# Patient Record
Sex: MALE | Race: BLACK OR AFRICAN AMERICAN | NOT HISPANIC OR LATINO | URBAN - METROPOLITAN AREA
[De-identification: names, ages, dates, MRNs, and addresses within clinical notes are randomized per-mention and may not be internally consistent; named-entity substitution may affect disease eponyms.]

---

## 2020-01-01 ENCOUNTER — INPATIENT (INPATIENT)
Facility: HOSPITAL | Age: 85
LOS: 9 days | End: 2020-07-20
Attending: INTERNAL MEDICINE | Admitting: INTERNAL MEDICINE
Payer: MEDICARE

## 2020-01-01 VITALS
DIASTOLIC BLOOD PRESSURE: 61 MMHG | OXYGEN SATURATION: 94 % | SYSTOLIC BLOOD PRESSURE: 85 MMHG | HEART RATE: 93 BPM | RESPIRATION RATE: 17 BRPM

## 2020-01-01 VITALS
WEIGHT: 130.07 LBS | HEART RATE: 116 BPM | DIASTOLIC BLOOD PRESSURE: 42 MMHG | SYSTOLIC BLOOD PRESSURE: 64 MMHG | OXYGEN SATURATION: 97 % | TEMPERATURE: 101 F | HEIGHT: 71 IN

## 2020-01-01 DIAGNOSIS — A41.52 SEPSIS DUE TO PSEUDOMONAS: ICD-10-CM

## 2020-01-01 DIAGNOSIS — E44.0 MODERATE PROTEIN-CALORIE MALNUTRITION: ICD-10-CM

## 2020-01-01 DIAGNOSIS — D50.0 IRON DEFICIENCY ANEMIA SECONDARY TO BLOOD LOSS (CHRONIC): ICD-10-CM

## 2020-01-01 DIAGNOSIS — K59.00 CONSTIPATION, UNSPECIFIED: ICD-10-CM

## 2020-01-01 DIAGNOSIS — C90.00 MULTIPLE MYELOMA NOT HAVING ACHIEVED REMISSION: ICD-10-CM

## 2020-01-01 LAB
-  AMIKACIN: SIGNIFICANT CHANGE UP
-  AZTREONAM: SIGNIFICANT CHANGE UP
-  CEFEPIME: SIGNIFICANT CHANGE UP
-  CEFTAZIDIME: SIGNIFICANT CHANGE UP
-  CIPROFLOXACIN: SIGNIFICANT CHANGE UP
-  GENTAMICIN: SIGNIFICANT CHANGE UP
-  IMIPENEM: SIGNIFICANT CHANGE UP
-  LEVOFLOXACIN: SIGNIFICANT CHANGE UP
-  MEROPENEM: SIGNIFICANT CHANGE UP
-  PIPERACILLIN/TAZOBACTAM: SIGNIFICANT CHANGE UP
-  TOBRAMYCIN: SIGNIFICANT CHANGE UP
ALBUMIN SERPL ELPH-MCNC: 1.4 G/DL — LOW (ref 3.3–5)
ALLERGY+IMMUNOLOGY DIAG STUDY NOTE: SIGNIFICANT CHANGE UP
ALP SERPL-CCNC: 51 U/L — SIGNIFICANT CHANGE UP (ref 40–120)
ALT FLD-CCNC: 8 U/L — LOW (ref 12–78)
ANION GAP SERPL CALC-SCNC: 10 MMOL/L — SIGNIFICANT CHANGE UP (ref 5–17)
ANION GAP SERPL CALC-SCNC: 7 MMOL/L — SIGNIFICANT CHANGE UP (ref 5–17)
ANION GAP SERPL CALC-SCNC: 9 MMOL/L — SIGNIFICANT CHANGE UP (ref 5–17)
ANISOCYTOSIS BLD QL: SIGNIFICANT CHANGE UP
APPEARANCE UR: ABNORMAL
APPEARANCE UR: CLEAR — SIGNIFICANT CHANGE UP
APTT BLD: 36 SEC — HIGH (ref 27.5–35.5)
AST SERPL-CCNC: 15 U/L — SIGNIFICANT CHANGE UP (ref 15–37)
BACTERIA # UR AUTO: ABNORMAL
BASE EXCESS BLDA CALC-SCNC: 0.7 MMOL/L — SIGNIFICANT CHANGE UP (ref -2–2)
BASOPHILS # BLD AUTO: 0 K/UL — SIGNIFICANT CHANGE UP (ref 0–0.2)
BASOPHILS NFR BLD AUTO: 0 % — SIGNIFICANT CHANGE UP (ref 0–2)
BILIRUB SERPL-MCNC: 0.5 MG/DL — SIGNIFICANT CHANGE UP (ref 0.2–1.2)
BILIRUB UR-MCNC: NEGATIVE — SIGNIFICANT CHANGE UP
BILIRUB UR-MCNC: NEGATIVE — SIGNIFICANT CHANGE UP
BLD GP AB SCN SERPL QL: SIGNIFICANT CHANGE UP
BLD GP AB SCN SERPL QL: SIGNIFICANT CHANGE UP
BLOOD GAS COMMENTS: SIGNIFICANT CHANGE UP
BLOOD GAS COMMENTS: SIGNIFICANT CHANGE UP
BLOOD GAS SOURCE: SIGNIFICANT CHANGE UP
BUN SERPL-MCNC: 16 MG/DL — SIGNIFICANT CHANGE UP (ref 7–23)
BUN SERPL-MCNC: 17 MG/DL — SIGNIFICANT CHANGE UP (ref 7–23)
BUN SERPL-MCNC: 19 MG/DL — SIGNIFICANT CHANGE UP (ref 7–23)
BUN SERPL-MCNC: 20 MG/DL — SIGNIFICANT CHANGE UP (ref 7–23)
CA-I BLD-SCNC: 1.13 MMOL/L — SIGNIFICANT CHANGE UP (ref 1.12–1.3)
CALCIUM SERPL-MCNC: 7.1 MG/DL — LOW (ref 8.5–10.1)
CALCIUM SERPL-MCNC: 7.2 MG/DL — LOW (ref 8.5–10.1)
CALCIUM SERPL-MCNC: 7.8 MG/DL — LOW (ref 8.5–10.1)
CALCIUM SERPL-MCNC: 7.8 MG/DL — LOW (ref 8.5–10.1)
CALCIUM SERPL-MCNC: 8.1 MG/DL — LOW (ref 8.5–10.1)
CALCIUM SERPL-MCNC: 8.2 MG/DL — LOW (ref 8.5–10.1)
CHLORIDE SERPL-SCNC: 104 MMOL/L — SIGNIFICANT CHANGE UP (ref 96–108)
CHLORIDE SERPL-SCNC: 109 MMOL/L — HIGH (ref 96–108)
CHLORIDE SERPL-SCNC: 110 MMOL/L — HIGH (ref 96–108)
CHLORIDE SERPL-SCNC: 111 MMOL/L — HIGH (ref 96–108)
CHLORIDE SERPL-SCNC: 112 MMOL/L — HIGH (ref 96–108)
CHLORIDE SERPL-SCNC: 113 MMOL/L — HIGH (ref 96–108)
CO2 SERPL-SCNC: 20 MMOL/L — LOW (ref 22–31)
CO2 SERPL-SCNC: 21 MMOL/L — LOW (ref 22–31)
CO2 SERPL-SCNC: 22 MMOL/L — SIGNIFICANT CHANGE UP (ref 22–31)
CO2 SERPL-SCNC: 22 MMOL/L — SIGNIFICANT CHANGE UP (ref 22–31)
CO2 SERPL-SCNC: 23 MMOL/L — SIGNIFICANT CHANGE UP (ref 22–31)
CO2 SERPL-SCNC: 24 MMOL/L — SIGNIFICANT CHANGE UP (ref 22–31)
COLOR SPEC: YELLOW — SIGNIFICANT CHANGE UP
COLOR SPEC: YELLOW — SIGNIFICANT CHANGE UP
CREAT SERPL-MCNC: 0.84 MG/DL — SIGNIFICANT CHANGE UP (ref 0.5–1.3)
CREAT SERPL-MCNC: 0.84 MG/DL — SIGNIFICANT CHANGE UP (ref 0.5–1.3)
CREAT SERPL-MCNC: 0.93 MG/DL — SIGNIFICANT CHANGE UP (ref 0.5–1.3)
CREAT SERPL-MCNC: 0.99 MG/DL — SIGNIFICANT CHANGE UP (ref 0.5–1.3)
CREAT SERPL-MCNC: 1.02 MG/DL — SIGNIFICANT CHANGE UP (ref 0.5–1.3)
CREAT SERPL-MCNC: 1.05 MG/DL — SIGNIFICANT CHANGE UP (ref 0.5–1.3)
CULTURE RESULTS: SIGNIFICANT CHANGE UP
DIFF PNL FLD: ABNORMAL
DIFF PNL FLD: ABNORMAL
DIR ANTIGLOB POLYSPECIFIC INTERPRETATION: SIGNIFICANT CHANGE UP
DIR ANTIGLOB POLYSPECIFIC INTERPRETATION: SIGNIFICANT CHANGE UP
ELLIPTOCYTES BLD QL SMEAR: SLIGHT — SIGNIFICANT CHANGE UP
EOSINOPHIL # BLD AUTO: 0 K/UL — SIGNIFICANT CHANGE UP (ref 0–0.5)
EOSINOPHIL NFR BLD AUTO: 0 % — SIGNIFICANT CHANGE UP (ref 0–6)
EPI CELLS # UR: ABNORMAL
EPI CELLS # UR: SIGNIFICANT CHANGE UP
GLUCOSE BLDC GLUCOMTR-MCNC: 141 MG/DL — HIGH (ref 70–99)
GLUCOSE SERPL-MCNC: 124 MG/DL — HIGH (ref 70–99)
GLUCOSE SERPL-MCNC: 134 MG/DL — HIGH (ref 70–99)
GLUCOSE SERPL-MCNC: 140 MG/DL — HIGH (ref 70–99)
GLUCOSE SERPL-MCNC: 141 MG/DL — HIGH (ref 70–99)
GLUCOSE SERPL-MCNC: 186 MG/DL — HIGH (ref 70–99)
GLUCOSE SERPL-MCNC: 90 MG/DL — SIGNIFICANT CHANGE UP (ref 70–99)
GLUCOSE UR QL: NEGATIVE MG/DL — SIGNIFICANT CHANGE UP
GLUCOSE UR QL: NEGATIVE MG/DL — SIGNIFICANT CHANGE UP
HCO3 BLDA-SCNC: 24 MMOL/L — SIGNIFICANT CHANGE UP (ref 21–29)
HCT VFR BLD CALC: 21.7 % — LOW (ref 39–50)
HCT VFR BLD CALC: 21.9 % — LOW (ref 39–50)
HCT VFR BLD CALC: 22.3 % — LOW (ref 39–50)
HCT VFR BLD CALC: 23.7 % — LOW (ref 39–50)
HCT VFR BLD CALC: 24.3 % — LOW (ref 39–50)
HCT VFR BLD CALC: 24.9 % — LOW (ref 39–50)
HCT VFR BLD CALC: 26 % — LOW (ref 39–50)
HCT VFR BLD CALC: 26.7 % — LOW (ref 39–50)
HCT VFR BLD CALC: 26.8 % — LOW (ref 39–50)
HCT VFR BLD CALC: 27.6 % — LOW (ref 39–50)
HCT VFR BLD CALC: 28 % — LOW (ref 39–50)
HCT VFR BLD CALC: 29.2 % — LOW (ref 39–50)
HCT VFR BLD CALC: 29.5 % — LOW (ref 39–50)
HGB BLD-MCNC: 6.8 G/DL — CRITICAL LOW (ref 13–17)
HGB BLD-MCNC: 7 G/DL — CRITICAL LOW (ref 13–17)
HGB BLD-MCNC: 7 G/DL — CRITICAL LOW (ref 13–17)
HGB BLD-MCNC: 7.2 G/DL — LOW (ref 13–17)
HGB BLD-MCNC: 7.4 G/DL — LOW (ref 13–17)
HGB BLD-MCNC: 7.7 G/DL — LOW (ref 13–17)
HGB BLD-MCNC: 8.3 G/DL — LOW (ref 13–17)
HGB BLD-MCNC: 8.6 G/DL — LOW (ref 13–17)
HGB BLD-MCNC: 8.7 G/DL — LOW (ref 13–17)
HGB BLD-MCNC: 8.8 G/DL — LOW (ref 13–17)
HGB BLD-MCNC: 9.2 G/DL — LOW (ref 13–17)
HOROWITZ INDEX BLDA+IHG-RTO: 0.21 — SIGNIFICANT CHANGE UP
HYALINE CASTS # UR AUTO: ABNORMAL /LPF
HYPOCHROMIA BLD QL: SIGNIFICANT CHANGE UP
INR BLD: 1.87 RATIO — HIGH (ref 0.88–1.16)
KETONES UR-MCNC: NEGATIVE — SIGNIFICANT CHANGE UP
KETONES UR-MCNC: NEGATIVE — SIGNIFICANT CHANGE UP
LACTATE SERPL-SCNC: 0.9 MMOL/L — SIGNIFICANT CHANGE UP (ref 0.7–2)
LACTATE SERPL-SCNC: 1.1 MMOL/L — SIGNIFICANT CHANGE UP (ref 0.7–2)
LACTATE SERPL-SCNC: 2.1 MMOL/L — HIGH (ref 0.7–2)
LACTATE SERPL-SCNC: 2.3 MMOL/L — HIGH (ref 0.7–2)
LEGIONELLA AG UR QL: NEGATIVE — SIGNIFICANT CHANGE UP
LEUKOCYTE ESTERASE UR-ACNC: ABNORMAL
LEUKOCYTE ESTERASE UR-ACNC: ABNORMAL
LYMPHOCYTES # BLD AUTO: 2.29 K/UL — SIGNIFICANT CHANGE UP (ref 1–3.3)
LYMPHOCYTES # BLD AUTO: 28 % — SIGNIFICANT CHANGE UP (ref 13–44)
MACROCYTES BLD QL: SLIGHT — SIGNIFICANT CHANGE UP
MAGNESIUM SERPL-MCNC: 2.3 MG/DL — SIGNIFICANT CHANGE UP (ref 1.6–2.6)
MAGNESIUM SERPL-MCNC: 2.3 MG/DL — SIGNIFICANT CHANGE UP (ref 1.6–2.6)
MAGNESIUM SERPL-MCNC: 2.4 MG/DL — SIGNIFICANT CHANGE UP (ref 1.6–2.6)
MANUAL SMEAR VERIFICATION: SIGNIFICANT CHANGE UP
MCHC RBC-ENTMCNC: 30.4 GM/DL — LOW (ref 32–36)
MCHC RBC-ENTMCNC: 30.5 GM/DL — LOW (ref 32–36)
MCHC RBC-ENTMCNC: 30.6 PG — SIGNIFICANT CHANGE UP (ref 27–34)
MCHC RBC-ENTMCNC: 30.7 PG — SIGNIFICANT CHANGE UP (ref 27–34)
MCHC RBC-ENTMCNC: 30.9 GM/DL — LOW (ref 32–36)
MCHC RBC-ENTMCNC: 30.9 PG — SIGNIFICANT CHANGE UP (ref 27–34)
MCHC RBC-ENTMCNC: 30.9 PG — SIGNIFICANT CHANGE UP (ref 27–34)
MCHC RBC-ENTMCNC: 31.1 PG — SIGNIFICANT CHANGE UP (ref 27–34)
MCHC RBC-ENTMCNC: 31.2 GM/DL — LOW (ref 32–36)
MCHC RBC-ENTMCNC: 31.3 GM/DL — LOW (ref 32–36)
MCHC RBC-ENTMCNC: 31.3 PG — SIGNIFICANT CHANGE UP (ref 27–34)
MCHC RBC-ENTMCNC: 31.3 PG — SIGNIFICANT CHANGE UP (ref 27–34)
MCHC RBC-ENTMCNC: 31.4 GM/DL — LOW (ref 32–36)
MCHC RBC-ENTMCNC: 31.4 PG — SIGNIFICANT CHANGE UP (ref 27–34)
MCHC RBC-ENTMCNC: 31.5 GM/DL — LOW (ref 32–36)
MCHC RBC-ENTMCNC: 31.5 PG — SIGNIFICANT CHANGE UP (ref 27–34)
MCHC RBC-ENTMCNC: 31.9 GM/DL — LOW (ref 32–36)
MCHC RBC-ENTMCNC: 31.9 GM/DL — LOW (ref 32–36)
MCHC RBC-ENTMCNC: 32 GM/DL — SIGNIFICANT CHANGE UP (ref 32–36)
MCHC RBC-ENTMCNC: 32.1 GM/DL — SIGNIFICANT CHANGE UP (ref 32–36)
MCHC RBC-ENTMCNC: 32.6 GM/DL — SIGNIFICANT CHANGE UP (ref 32–36)
MCHC RBC-ENTMCNC: 32.9 GM/DL — SIGNIFICANT CHANGE UP (ref 32–36)
MCV RBC AUTO: 100 FL — SIGNIFICANT CHANGE UP (ref 80–100)
MCV RBC AUTO: 100.9 FL — HIGH (ref 80–100)
MCV RBC AUTO: 103.4 FL — HIGH (ref 80–100)
MCV RBC AUTO: 94.7 FL — SIGNIFICANT CHANGE UP (ref 80–100)
MCV RBC AUTO: 95.6 FL — SIGNIFICANT CHANGE UP (ref 80–100)
MCV RBC AUTO: 95.7 FL — SIGNIFICANT CHANGE UP (ref 80–100)
MCV RBC AUTO: 97 FL — SIGNIFICANT CHANGE UP (ref 80–100)
MCV RBC AUTO: 97.3 FL — SIGNIFICANT CHANGE UP (ref 80–100)
MCV RBC AUTO: 98.5 FL — SIGNIFICANT CHANGE UP (ref 80–100)
MCV RBC AUTO: 98.6 FL — SIGNIFICANT CHANGE UP (ref 80–100)
MCV RBC AUTO: 98.8 FL — SIGNIFICANT CHANGE UP (ref 80–100)
MCV RBC AUTO: 99 FL — SIGNIFICANT CHANGE UP (ref 80–100)
MCV RBC AUTO: 99.6 FL — SIGNIFICANT CHANGE UP (ref 80–100)
METHOD TYPE: SIGNIFICANT CHANGE UP
MICROCYTES BLD QL: SLIGHT — SIGNIFICANT CHANGE UP
MONOCYTES # BLD AUTO: 0.08 K/UL — SIGNIFICANT CHANGE UP (ref 0–0.9)
MONOCYTES NFR BLD AUTO: 1 % — LOW (ref 2–14)
NEUTROPHILS # BLD AUTO: 5.81 K/UL — SIGNIFICANT CHANGE UP (ref 1.8–7.4)
NEUTROPHILS NFR BLD AUTO: 70 % — SIGNIFICANT CHANGE UP (ref 43–77)
NEUTS BAND # BLD: 1 % — SIGNIFICANT CHANGE UP (ref 0–8)
NITRITE UR-MCNC: NEGATIVE — SIGNIFICANT CHANGE UP
NITRITE UR-MCNC: NEGATIVE — SIGNIFICANT CHANGE UP
NRBC # BLD: 0 /100 WBCS — SIGNIFICANT CHANGE UP (ref 0–0)
NRBC # BLD: 0 /100 — SIGNIFICANT CHANGE UP (ref 0–0)
NRBC # BLD: 1 /100 WBCS — HIGH (ref 0–0)
NRBC # BLD: 2 /100 WBCS — HIGH (ref 0–0)
NRBC # BLD: 3 /100 WBCS — HIGH (ref 0–0)
NRBC # BLD: 6 /100 WBCS — HIGH (ref 0–0)
NRBC # BLD: 6 /100 WBCS — HIGH (ref 0–0)
NRBC # BLD: 7 /100 WBCS — HIGH (ref 0–0)
NRBC # BLD: 9 /100 WBCS — HIGH (ref 0–0)
NRBC # BLD: SIGNIFICANT CHANGE UP /100 WBCS (ref 0–0)
OB PNL STL: NEGATIVE — SIGNIFICANT CHANGE UP
ORGANISM # SPEC MICROSCOPIC CNT: SIGNIFICANT CHANGE UP
ORGANISM # SPEC MICROSCOPIC CNT: SIGNIFICANT CHANGE UP
PCO2 BLDA: 31 MMHG — LOW (ref 32–46)
PH BLD: 7.49 — HIGH (ref 7.35–7.45)
PH UR: 5 — SIGNIFICANT CHANGE UP (ref 5–8)
PH UR: 7 — SIGNIFICANT CHANGE UP (ref 5–8)
PHOSPHATE SERPL-MCNC: 2.5 MG/DL — SIGNIFICANT CHANGE UP (ref 2.5–4.5)
PHOSPHATE SERPL-MCNC: 3 MG/DL — SIGNIFICANT CHANGE UP (ref 2.5–4.5)
PLAT MORPH BLD: NORMAL — SIGNIFICANT CHANGE UP
PLATELET # BLD AUTO: 27 K/UL — LOW (ref 150–400)
PLATELET # BLD AUTO: 34 K/UL — LOW (ref 150–400)
PLATELET # BLD AUTO: 40 K/UL — LOW (ref 150–400)
PLATELET # BLD AUTO: 43 K/UL — LOW (ref 150–400)
PLATELET # BLD AUTO: 51 K/UL — LOW (ref 150–400)
PLATELET # BLD AUTO: 52 K/UL — LOW (ref 150–400)
PLATELET # BLD AUTO: 52 K/UL — LOW (ref 150–400)
PLATELET # BLD AUTO: 53 K/UL — LOW (ref 150–400)
PLATELET # BLD AUTO: 53 K/UL — LOW (ref 150–400)
PLATELET # BLD AUTO: 54 K/UL — LOW (ref 150–400)
PLATELET # BLD AUTO: 57 K/UL — LOW (ref 150–400)
PLATELET # BLD AUTO: 66 K/UL — LOW (ref 150–400)
PLATELET # BLD AUTO: 75 K/UL — LOW (ref 150–400)
PO2 BLDA: 82 MMHG — SIGNIFICANT CHANGE UP (ref 74–108)
POIKILOCYTOSIS BLD QL AUTO: SLIGHT — SIGNIFICANT CHANGE UP
POTASSIUM SERPL-MCNC: 3.6 MMOL/L — SIGNIFICANT CHANGE UP (ref 3.5–5.3)
POTASSIUM SERPL-MCNC: 3.6 MMOL/L — SIGNIFICANT CHANGE UP (ref 3.5–5.3)
POTASSIUM SERPL-MCNC: 3.8 MMOL/L — SIGNIFICANT CHANGE UP (ref 3.5–5.3)
POTASSIUM SERPL-MCNC: 4.1 MMOL/L — SIGNIFICANT CHANGE UP (ref 3.5–5.3)
POTASSIUM SERPL-MCNC: 4.3 MMOL/L — SIGNIFICANT CHANGE UP (ref 3.5–5.3)
POTASSIUM SERPL-MCNC: 4.5 MMOL/L — SIGNIFICANT CHANGE UP (ref 3.5–5.3)
POTASSIUM SERPL-SCNC: 3.6 MMOL/L — SIGNIFICANT CHANGE UP (ref 3.5–5.3)
POTASSIUM SERPL-SCNC: 3.6 MMOL/L — SIGNIFICANT CHANGE UP (ref 3.5–5.3)
POTASSIUM SERPL-SCNC: 3.8 MMOL/L — SIGNIFICANT CHANGE UP (ref 3.5–5.3)
POTASSIUM SERPL-SCNC: 4.1 MMOL/L — SIGNIFICANT CHANGE UP (ref 3.5–5.3)
POTASSIUM SERPL-SCNC: 4.3 MMOL/L — SIGNIFICANT CHANGE UP (ref 3.5–5.3)
POTASSIUM SERPL-SCNC: 4.5 MMOL/L — SIGNIFICANT CHANGE UP (ref 3.5–5.3)
PROT SERPL-MCNC: 7.8 GM/DL — SIGNIFICANT CHANGE UP (ref 6–8.3)
PROT UR-MCNC: 30 MG/DL
PROT UR-MCNC: 500 MG/DL
PROTHROM AB SERPL-ACNC: 20.1 SEC — HIGH (ref 10.6–13.6)
RBC # BLD: 2.17 M/UL — LOW (ref 4.2–5.8)
RBC # BLD: 2.24 M/UL — LOW (ref 4.2–5.8)
RBC # BLD: 2.25 M/UL — LOW (ref 4.2–5.8)
RBC # BLD: 2.35 M/UL — LOW (ref 4.2–5.8)
RBC # BLD: 2.35 M/UL — LOW (ref 4.2–5.8)
RBC # BLD: 2.52 M/UL — LOW (ref 4.2–5.8)
RBC # BLD: 2.64 M/UL — LOW (ref 4.2–5.8)
RBC # BLD: 2.8 M/UL — LOW (ref 4.2–5.8)
RBC # BLD: 2.8 M/UL — LOW (ref 4.2–5.8)
RBC # BLD: 2.82 M/UL — LOW (ref 4.2–5.8)
RBC # BLD: 2.93 M/UL — LOW (ref 4.2–5.8)
RBC # BLD: 2.98 M/UL — LOW (ref 4.2–5.8)
RBC # BLD: 3.01 M/UL — LOW (ref 4.2–5.8)
RBC # FLD: 21.1 % — HIGH (ref 10.3–14.5)
RBC # FLD: 21.3 % — HIGH (ref 10.3–14.5)
RBC # FLD: 21.7 % — HIGH (ref 10.3–14.5)
RBC # FLD: 22.2 % — HIGH (ref 10.3–14.5)
RBC # FLD: 23.9 % — HIGH (ref 10.3–14.5)
RBC # FLD: 24 % — HIGH (ref 10.3–14.5)
RBC # FLD: 24.1 % — HIGH (ref 10.3–14.5)
RBC # FLD: 24.2 % — HIGH (ref 10.3–14.5)
RBC # FLD: 24.4 % — HIGH (ref 10.3–14.5)
RBC # FLD: 24.5 % — HIGH (ref 10.3–14.5)
RBC # FLD: 24.5 % — HIGH (ref 10.3–14.5)
RBC # FLD: 24.7 % — HIGH (ref 10.3–14.5)
RBC # FLD: 25.8 % — HIGH (ref 10.3–14.5)
RBC BLD AUTO: ABNORMAL
RBC CASTS # UR COMP ASSIST: ABNORMAL /HPF (ref 0–4)
RBC CASTS # UR COMP ASSIST: SIGNIFICANT CHANGE UP /HPF (ref 0–4)
SAO2 % BLDA: 96 % — SIGNIFICANT CHANGE UP (ref 92–96)
SARS-COV-2 IGG SERPL QL IA: NEGATIVE — SIGNIFICANT CHANGE UP
SARS-COV-2 IGM SERPL IA-ACNC: <0.1 INDEX — SIGNIFICANT CHANGE UP
SARS-COV-2 RNA SPEC QL NAA+PROBE: SIGNIFICANT CHANGE UP
SMUDGE CELLS # BLD: PRESENT — SIGNIFICANT CHANGE UP
SODIUM SERPL-SCNC: 138 MMOL/L — SIGNIFICANT CHANGE UP (ref 135–145)
SODIUM SERPL-SCNC: 139 MMOL/L — SIGNIFICANT CHANGE UP (ref 135–145)
SODIUM SERPL-SCNC: 141 MMOL/L — SIGNIFICANT CHANGE UP (ref 135–145)
SODIUM SERPL-SCNC: 144 MMOL/L — SIGNIFICANT CHANGE UP (ref 135–145)
SP GR SPEC: 1 — LOW (ref 1.01–1.02)
SP GR SPEC: 1.01 — SIGNIFICANT CHANGE UP (ref 1.01–1.02)
SPECIMEN SOURCE: SIGNIFICANT CHANGE UP
TROPONIN I SERPL-MCNC: 0.04 NG/ML — SIGNIFICANT CHANGE UP (ref 0.01–0.04)
UROBILINOGEN FLD QL: NEGATIVE MG/DL — SIGNIFICANT CHANGE UP
UROBILINOGEN FLD QL: NEGATIVE MG/DL — SIGNIFICANT CHANGE UP
WBC # BLD: 2.8 K/UL — LOW (ref 3.8–10.5)
WBC # BLD: 2.9 K/UL — LOW (ref 3.8–10.5)
WBC # BLD: 3.04 K/UL — LOW (ref 3.8–10.5)
WBC # BLD: 3.2 K/UL — LOW (ref 3.8–10.5)
WBC # BLD: 3.25 K/UL — LOW (ref 3.8–10.5)
WBC # BLD: 3.45 K/UL — LOW (ref 3.8–10.5)
WBC # BLD: 3.55 K/UL — LOW (ref 3.8–10.5)
WBC # BLD: 4.21 K/UL — SIGNIFICANT CHANGE UP (ref 3.8–10.5)
WBC # BLD: 7.86 K/UL — SIGNIFICANT CHANGE UP (ref 3.8–10.5)
WBC # BLD: 8.18 K/UL — SIGNIFICANT CHANGE UP (ref 3.8–10.5)
WBC # BLD: 8.2 K/UL — SIGNIFICANT CHANGE UP (ref 3.8–10.5)
WBC # BLD: 8.26 K/UL — SIGNIFICANT CHANGE UP (ref 3.8–10.5)
WBC # BLD: 8.69 K/UL — SIGNIFICANT CHANGE UP (ref 3.8–10.5)
WBC # FLD AUTO: 2.8 K/UL — LOW (ref 3.8–10.5)
WBC # FLD AUTO: 2.9 K/UL — LOW (ref 3.8–10.5)
WBC # FLD AUTO: 3.04 K/UL — LOW (ref 3.8–10.5)
WBC # FLD AUTO: 3.2 K/UL — LOW (ref 3.8–10.5)
WBC # FLD AUTO: 3.25 K/UL — LOW (ref 3.8–10.5)
WBC # FLD AUTO: 3.45 K/UL — LOW (ref 3.8–10.5)
WBC # FLD AUTO: 3.55 K/UL — LOW (ref 3.8–10.5)
WBC # FLD AUTO: 4.21 K/UL — SIGNIFICANT CHANGE UP (ref 3.8–10.5)
WBC # FLD AUTO: 7.86 K/UL — SIGNIFICANT CHANGE UP (ref 3.8–10.5)
WBC # FLD AUTO: 8.18 K/UL — SIGNIFICANT CHANGE UP (ref 3.8–10.5)
WBC # FLD AUTO: 8.2 K/UL — SIGNIFICANT CHANGE UP (ref 3.8–10.5)
WBC # FLD AUTO: 8.26 K/UL — SIGNIFICANT CHANGE UP (ref 3.8–10.5)
WBC # FLD AUTO: 8.69 K/UL — SIGNIFICANT CHANGE UP (ref 3.8–10.5)
WBC UR QL: >50
WBC UR QL: SIGNIFICANT CHANGE UP

## 2020-01-01 PROCEDURE — 99233 SBSQ HOSP IP/OBS HIGH 50: CPT

## 2020-01-01 PROCEDURE — 86077 PHYS BLOOD BANK SERV XMATCH: CPT

## 2020-01-01 PROCEDURE — 99498 ADVNCD CARE PLAN ADDL 30 MIN: CPT

## 2020-01-01 PROCEDURE — 99291 CRITICAL CARE FIRST HOUR: CPT

## 2020-01-01 PROCEDURE — 99223 1ST HOSP IP/OBS HIGH 75: CPT

## 2020-01-01 PROCEDURE — 99239 HOSP IP/OBS DSCHRG MGMT >30: CPT

## 2020-01-01 PROCEDURE — 71045 X-RAY EXAM CHEST 1 VIEW: CPT | Mod: 26

## 2020-01-01 PROCEDURE — 93010 ELECTROCARDIOGRAM REPORT: CPT

## 2020-01-01 PROCEDURE — 99497 ADVNCD CARE PLAN 30 MIN: CPT

## 2020-01-01 PROCEDURE — 70450 CT HEAD/BRAIN W/O DYE: CPT | Mod: 26

## 2020-01-01 RX ORDER — SODIUM CHLORIDE 9 MG/ML
1000 INJECTION, SOLUTION INTRAVENOUS
Refills: 0 | Status: DISCONTINUED | OUTPATIENT
Start: 2020-01-01 | End: 2020-01-01

## 2020-01-01 RX ORDER — PROCHLORPERAZINE MALEATE 5 MG
0 TABLET ORAL
Qty: 0 | Refills: 0 | DISCHARGE

## 2020-01-01 RX ORDER — CIPROFLOXACIN LACTATE 400MG/40ML
VIAL (ML) INTRAVENOUS
Refills: 0 | Status: DISCONTINUED | OUTPATIENT
Start: 2020-01-01 | End: 2020-01-01

## 2020-01-01 RX ORDER — PIPERACILLIN AND TAZOBACTAM 4; .5 G/20ML; G/20ML
3.38 INJECTION, POWDER, LYOPHILIZED, FOR SOLUTION INTRAVENOUS ONCE
Refills: 0 | Status: DISCONTINUED | OUTPATIENT
Start: 2020-01-01 | End: 2020-01-01

## 2020-01-01 RX ORDER — ACETAMINOPHEN 500 MG
650 TABLET ORAL EVERY 6 HOURS
Refills: 0 | Status: DISCONTINUED | OUTPATIENT
Start: 2020-01-01 | End: 2020-01-01

## 2020-01-01 RX ORDER — DEXAMETHASONE 0.5 MG/5ML
4 ELIXIR ORAL
Qty: 0 | Refills: 0 | DISCHARGE

## 2020-01-01 RX ORDER — PANTOPRAZOLE SODIUM 20 MG/1
40 TABLET, DELAYED RELEASE ORAL DAILY
Refills: 0 | Status: DISCONTINUED | OUTPATIENT
Start: 2020-01-01 | End: 2020-01-01

## 2020-01-01 RX ORDER — CIPROFLOXACIN LACTATE 400MG/40ML
400 VIAL (ML) INTRAVENOUS ONCE
Refills: 0 | Status: COMPLETED | OUTPATIENT
Start: 2020-01-01 | End: 2020-01-01

## 2020-01-01 RX ORDER — LATANOPROST 0.05 MG/ML
1 SOLUTION/ DROPS OPHTHALMIC; TOPICAL AT BEDTIME
Refills: 0 | Status: DISCONTINUED | OUTPATIENT
Start: 2020-01-01 | End: 2020-01-01

## 2020-01-01 RX ORDER — CIPROFLOXACIN LACTATE 400MG/40ML
400 VIAL (ML) INTRAVENOUS EVERY 12 HOURS
Refills: 0 | Status: DISCONTINUED | OUTPATIENT
Start: 2020-01-01 | End: 2020-01-01

## 2020-01-01 RX ORDER — MIDODRINE HYDROCHLORIDE 2.5 MG/1
10 TABLET ORAL ONCE
Refills: 0 | Status: COMPLETED | OUTPATIENT
Start: 2020-01-01 | End: 2020-01-01

## 2020-01-01 RX ORDER — CHLORHEXIDINE GLUCONATE 213 G/1000ML
1 SOLUTION TOPICAL
Refills: 0 | Status: DISCONTINUED | OUTPATIENT
Start: 2020-01-01 | End: 2020-01-01

## 2020-01-01 RX ORDER — ACYCLOVIR SODIUM 500 MG
0 VIAL (EA) INTRAVENOUS
Qty: 0 | Refills: 0 | DISCHARGE

## 2020-01-01 RX ORDER — SODIUM CHLORIDE 9 MG/ML
2000 INJECTION, SOLUTION INTRAVENOUS ONCE
Refills: 0 | Status: COMPLETED | OUTPATIENT
Start: 2020-01-01 | End: 2020-01-01

## 2020-01-01 RX ORDER — MIDODRINE HYDROCHLORIDE 2.5 MG/1
10 TABLET ORAL THREE TIMES A DAY
Refills: 0 | Status: DISCONTINUED | OUTPATIENT
Start: 2020-01-01 | End: 2020-01-01

## 2020-01-01 RX ORDER — LACTULOSE 10 G/15ML
20 SOLUTION ORAL ONCE
Refills: 0 | Status: COMPLETED | OUTPATIENT
Start: 2020-01-01 | End: 2020-01-01

## 2020-01-01 RX ORDER — SODIUM CHLORIDE 9 MG/ML
500 INJECTION INTRAMUSCULAR; INTRAVENOUS; SUBCUTANEOUS ONCE
Refills: 0 | Status: COMPLETED | OUTPATIENT
Start: 2020-01-01 | End: 2020-01-01

## 2020-01-01 RX ORDER — DEXAMETHASONE 0.5 MG/5ML
0 ELIXIR ORAL
Qty: 0 | Refills: 0 | DISCHARGE

## 2020-01-01 RX ORDER — DEXAMETHASONE 0.5 MG/5ML
20 ELIXIR ORAL EVERY 12 HOURS
Refills: 0 | Status: DISCONTINUED | OUTPATIENT
Start: 2020-01-01 | End: 2020-01-01

## 2020-01-01 RX ORDER — SODIUM CHLORIDE 9 MG/ML
1000 INJECTION INTRAMUSCULAR; INTRAVENOUS; SUBCUTANEOUS
Refills: 0 | Status: DISCONTINUED | OUTPATIENT
Start: 2020-01-01 | End: 2020-01-01

## 2020-01-01 RX ORDER — ENOXAPARIN SODIUM 100 MG/ML
40 INJECTION SUBCUTANEOUS DAILY
Refills: 0 | Status: DISCONTINUED | OUTPATIENT
Start: 2020-01-01 | End: 2020-01-01

## 2020-01-01 RX ORDER — HYDROCORTISONE 20 MG
50 TABLET ORAL EVERY 8 HOURS
Refills: 0 | Status: DISCONTINUED | OUTPATIENT
Start: 2020-01-01 | End: 2020-01-01

## 2020-01-01 RX ORDER — PANTOPRAZOLE SODIUM 20 MG/1
40 TABLET, DELAYED RELEASE ORAL
Refills: 0 | Status: DISCONTINUED | OUTPATIENT
Start: 2020-01-01 | End: 2020-01-01

## 2020-01-01 RX ORDER — BRIMONIDINE TARTRATE 2 MG/MG
1 SOLUTION/ DROPS OPHTHALMIC
Refills: 0 | Status: DISCONTINUED | OUTPATIENT
Start: 2020-01-01 | End: 2020-01-01

## 2020-01-01 RX ORDER — PIPERACILLIN AND TAZOBACTAM 4; .5 G/20ML; G/20ML
3.38 INJECTION, POWDER, LYOPHILIZED, FOR SOLUTION INTRAVENOUS EVERY 8 HOURS
Refills: 0 | Status: DISCONTINUED | OUTPATIENT
Start: 2020-01-01 | End: 2020-01-01

## 2020-01-01 RX ORDER — MIDODRINE HYDROCHLORIDE 2.5 MG/1
20 TABLET ORAL EVERY 8 HOURS
Refills: 0 | Status: DISCONTINUED | OUTPATIENT
Start: 2020-01-01 | End: 2020-01-01

## 2020-01-01 RX ORDER — SELINEXOR 20 MG/1
0 TABLET, FILM COATED ORAL
Qty: 0 | Refills: 0 | DISCHARGE

## 2020-01-01 RX ORDER — BRINZOLAMIDE/BRIMONIDINE TARTRATE 10; 2 MG/ML; MG/ML
1 SUSPENSION/ DROPS OPHTHALMIC
Qty: 0 | Refills: 0 | DISCHARGE

## 2020-01-01 RX ORDER — PIPERACILLIN AND TAZOBACTAM 4; .5 G/20ML; G/20ML
3.38 INJECTION, POWDER, LYOPHILIZED, FOR SOLUTION INTRAVENOUS ONCE
Refills: 0 | Status: COMPLETED | OUTPATIENT
Start: 2020-01-01 | End: 2020-01-01

## 2020-01-01 RX ORDER — NOREPINEPHRINE BITARTRATE/D5W 8 MG/250ML
0.05 PLASTIC BAG, INJECTION (ML) INTRAVENOUS
Qty: 8 | Refills: 0 | Status: DISCONTINUED | OUTPATIENT
Start: 2020-01-01 | End: 2020-01-01

## 2020-01-01 RX ORDER — SODIUM CHLORIDE 9 MG/ML
1000 INJECTION INTRAMUSCULAR; INTRAVENOUS; SUBCUTANEOUS ONCE
Refills: 0 | Status: COMPLETED | OUTPATIENT
Start: 2020-01-01 | End: 2020-01-01

## 2020-01-01 RX ORDER — ACYCLOVIR SODIUM 500 MG
400 VIAL (EA) INTRAVENOUS
Refills: 0 | Status: DISCONTINUED | OUTPATIENT
Start: 2020-01-01 | End: 2020-01-01

## 2020-01-01 RX ORDER — LEVETIRACETAM 250 MG/1
750 TABLET, FILM COATED ORAL EVERY 12 HOURS
Refills: 0 | Status: DISCONTINUED | OUTPATIENT
Start: 2020-01-01 | End: 2020-01-01

## 2020-01-01 RX ORDER — CEFTRIAXONE 500 MG/1
1000 INJECTION, POWDER, FOR SOLUTION INTRAMUSCULAR; INTRAVENOUS EVERY 24 HOURS
Refills: 0 | Status: DISCONTINUED | OUTPATIENT
Start: 2020-01-01 | End: 2020-01-01

## 2020-01-01 RX ORDER — ESOMEPRAZOLE MAGNESIUM 40 MG/1
0 CAPSULE, DELAYED RELEASE ORAL
Qty: 0 | Refills: 0 | DISCHARGE

## 2020-01-01 RX ORDER — TENOFOVIR DISOPROXIL FUMARATE 300 MG/1
0 TABLET, FILM COATED ORAL
Qty: 0 | Refills: 0 | DISCHARGE

## 2020-01-01 RX ORDER — HEPARIN SODIUM 5000 [USP'U]/ML
5000 INJECTION INTRAVENOUS; SUBCUTANEOUS EVERY 12 HOURS
Refills: 0 | Status: DISCONTINUED | OUTPATIENT
Start: 2020-01-01 | End: 2020-01-01

## 2020-01-01 RX ORDER — ACYCLOVIR SODIUM 500 MG
450 VIAL (EA) INTRAVENOUS EVERY 12 HOURS
Refills: 0 | Status: DISCONTINUED | OUTPATIENT
Start: 2020-01-01 | End: 2020-01-01

## 2020-01-01 RX ORDER — LATANOPROST 0.05 MG/ML
1 SOLUTION/ DROPS OPHTHALMIC; TOPICAL
Qty: 0 | Refills: 0 | DISCHARGE

## 2020-01-01 RX ORDER — ACETAMINOPHEN 500 MG
975 TABLET ORAL ONCE
Refills: 0 | Status: COMPLETED | OUTPATIENT
Start: 2020-01-01 | End: 2020-01-01

## 2020-01-01 RX ORDER — DORZOLAMIDE HYDROCHLORIDE 20 MG/ML
1 SOLUTION/ DROPS OPHTHALMIC
Refills: 0 | Status: DISCONTINUED | OUTPATIENT
Start: 2020-01-01 | End: 2020-01-01

## 2020-01-01 RX ORDER — VANCOMYCIN HCL 1 G
1000 VIAL (EA) INTRAVENOUS ONCE
Refills: 0 | Status: COMPLETED | OUTPATIENT
Start: 2020-01-01 | End: 2020-01-01

## 2020-01-01 RX ORDER — PROCHLORPERAZINE MALEATE 5 MG
10 TABLET ORAL
Qty: 0 | Refills: 0 | DISCHARGE

## 2020-01-01 RX ORDER — HYDROCORTISONE 20 MG
100 TABLET ORAL EVERY 8 HOURS
Refills: 0 | Status: DISCONTINUED | OUTPATIENT
Start: 2020-01-01 | End: 2020-01-01

## 2020-01-01 RX ORDER — SENNA PLUS 8.6 MG/1
2 TABLET ORAL AT BEDTIME
Refills: 0 | Status: DISCONTINUED | OUTPATIENT
Start: 2020-01-01 | End: 2020-01-01

## 2020-01-01 RX ORDER — SODIUM CHLORIDE 9 MG/ML
500 INJECTION INTRAMUSCULAR; INTRAVENOUS; SUBCUTANEOUS ONCE
Refills: 0 | Status: DISCONTINUED | OUTPATIENT
Start: 2020-01-01 | End: 2020-01-01

## 2020-01-01 RX ORDER — DEXAMETHASONE 0.5 MG/5ML
40 ELIXIR ORAL ONCE
Refills: 0 | Status: COMPLETED | OUTPATIENT
Start: 2020-01-01 | End: 2020-01-01

## 2020-01-01 RX ORDER — ACYCLOVIR SODIUM 500 MG
450 VIAL (EA) INTRAVENOUS
Refills: 0 | Status: DISCONTINUED | OUTPATIENT
Start: 2020-01-01 | End: 2020-01-01

## 2020-01-01 RX ORDER — LEVETIRACETAM 250 MG/1
750 TABLET, FILM COATED ORAL
Refills: 0 | Status: DISCONTINUED | OUTPATIENT
Start: 2020-01-01 | End: 2020-01-01

## 2020-01-01 RX ORDER — ERYTHROPOIETIN 10000 [IU]/ML
10000 INJECTION, SOLUTION INTRAVENOUS; SUBCUTANEOUS ONCE
Refills: 0 | Status: COMPLETED | OUTPATIENT
Start: 2020-01-01 | End: 2020-01-01

## 2020-01-01 RX ORDER — VANCOMYCIN HCL 1 G
1000 VIAL (EA) INTRAVENOUS EVERY 12 HOURS
Refills: 0 | Status: DISCONTINUED | OUTPATIENT
Start: 2020-01-01 | End: 2020-01-01

## 2020-01-01 RX ORDER — ACYCLOVIR SODIUM 500 MG
400 VIAL (EA) INTRAVENOUS
Qty: 0 | Refills: 0 | DISCHARGE

## 2020-01-01 RX ORDER — ONDANSETRON 8 MG/1
1 TABLET, FILM COATED ORAL
Qty: 0 | Refills: 0 | DISCHARGE

## 2020-01-01 RX ORDER — LOPERAMIDE HCL 2 MG
1 TABLET ORAL
Qty: 0 | Refills: 0 | DISCHARGE

## 2020-01-01 RX ADMIN — Medication 50 MILLIGRAM(S): at 14:20

## 2020-01-01 RX ADMIN — Medication 109 MILLIGRAM(S): at 17:43

## 2020-01-01 RX ADMIN — MIDODRINE HYDROCHLORIDE 10 MILLIGRAM(S): 2.5 TABLET ORAL at 14:20

## 2020-01-01 RX ADMIN — DORZOLAMIDE HYDROCHLORIDE 1 DROP(S): 20 SOLUTION/ DROPS OPHTHALMIC at 18:03

## 2020-01-01 RX ADMIN — Medication 109 MILLIGRAM(S): at 05:09

## 2020-01-01 RX ADMIN — Medication 109 MILLIGRAM(S): at 17:15

## 2020-01-01 RX ADMIN — Medication 120 MILLIGRAM(S): at 16:45

## 2020-01-01 RX ADMIN — MIDODRINE HYDROCHLORIDE 20 MILLIGRAM(S): 2.5 TABLET ORAL at 22:05

## 2020-01-01 RX ADMIN — Medication 200 MILLIGRAM(S): at 18:03

## 2020-01-01 RX ADMIN — CEFTRIAXONE 100 MILLIGRAM(S): 500 INJECTION, POWDER, FOR SOLUTION INTRAMUSCULAR; INTRAVENOUS at 12:54

## 2020-01-01 RX ADMIN — LEVETIRACETAM 400 MILLIGRAM(S): 250 TABLET, FILM COATED ORAL at 06:27

## 2020-01-01 RX ADMIN — Medication 109 MILLIGRAM(S): at 08:29

## 2020-01-01 RX ADMIN — ERYTHROPOIETIN 10000 UNIT(S): 10000 INJECTION, SOLUTION INTRAVENOUS; SUBCUTANEOUS at 14:41

## 2020-01-01 RX ADMIN — Medication 200 MILLIGRAM(S): at 17:19

## 2020-01-01 RX ADMIN — Medication 5.53 MICROGRAM(S)/KG/MIN: at 18:30

## 2020-01-01 RX ADMIN — Medication 200 MILLIGRAM(S): at 05:03

## 2020-01-01 RX ADMIN — MIDODRINE HYDROCHLORIDE 10 MILLIGRAM(S): 2.5 TABLET ORAL at 12:05

## 2020-01-01 RX ADMIN — SODIUM CHLORIDE 2000 MILLILITER(S): 9 INJECTION, SOLUTION INTRAVENOUS at 07:40

## 2020-01-01 RX ADMIN — LACTULOSE 20 GRAM(S): 10 SOLUTION ORAL at 14:58

## 2020-01-01 RX ADMIN — BRIMONIDINE TARTRATE 1 DROP(S): 2 SOLUTION/ DROPS OPHTHALMIC at 18:02

## 2020-01-01 RX ADMIN — DORZOLAMIDE HYDROCHLORIDE 1 DROP(S): 20 SOLUTION/ DROPS OPHTHALMIC at 17:03

## 2020-01-01 RX ADMIN — LEVETIRACETAM 400 MILLIGRAM(S): 250 TABLET, FILM COATED ORAL at 17:15

## 2020-01-01 RX ADMIN — SODIUM CHLORIDE 150 MILLILITER(S): 9 INJECTION INTRAMUSCULAR; INTRAVENOUS; SUBCUTANEOUS at 12:53

## 2020-01-01 RX ADMIN — LEVETIRACETAM 400 MILLIGRAM(S): 250 TABLET, FILM COATED ORAL at 17:45

## 2020-01-01 RX ADMIN — Medication 50 MILLIGRAM(S): at 05:02

## 2020-01-01 RX ADMIN — Medication 200 MILLIGRAM(S): at 22:21

## 2020-01-01 RX ADMIN — LEVETIRACETAM 400 MILLIGRAM(S): 250 TABLET, FILM COATED ORAL at 05:00

## 2020-01-01 RX ADMIN — Medication 200 MILLIGRAM(S): at 06:12

## 2020-01-01 RX ADMIN — SODIUM CHLORIDE 75 MILLILITER(S): 9 INJECTION, SOLUTION INTRAVENOUS at 16:45

## 2020-01-01 RX ADMIN — Medication 200 MILLIGRAM(S): at 05:10

## 2020-01-01 RX ADMIN — Medication 200 MILLIGRAM(S): at 17:16

## 2020-01-01 RX ADMIN — SODIUM CHLORIDE 150 MILLILITER(S): 9 INJECTION INTRAMUSCULAR; INTRAVENOUS; SUBCUTANEOUS at 13:51

## 2020-01-01 RX ADMIN — LEVETIRACETAM 400 MILLIGRAM(S): 250 TABLET, FILM COATED ORAL at 05:02

## 2020-01-01 RX ADMIN — Medication 400 MILLIGRAM(S): at 05:06

## 2020-01-01 RX ADMIN — SENNA PLUS 2 TABLET(S): 8.6 TABLET ORAL at 22:46

## 2020-01-01 RX ADMIN — Medication 200 MILLIGRAM(S): at 05:44

## 2020-01-01 RX ADMIN — Medication 100 MILLIGRAM(S): at 12:05

## 2020-01-01 RX ADMIN — ENOXAPARIN SODIUM 40 MILLIGRAM(S): 100 INJECTION SUBCUTANEOUS at 13:49

## 2020-01-01 RX ADMIN — SENNA PLUS 2 TABLET(S): 8.6 TABLET ORAL at 22:40

## 2020-01-01 RX ADMIN — Medication 200 MILLIGRAM(S): at 18:18

## 2020-01-01 RX ADMIN — Medication 400 MILLIGRAM(S): at 17:02

## 2020-01-01 RX ADMIN — BRIMONIDINE TARTRATE 1 DROP(S): 2 SOLUTION/ DROPS OPHTHALMIC at 17:03

## 2020-01-01 RX ADMIN — SODIUM CHLORIDE 1000 MILLILITER(S): 9 INJECTION INTRAMUSCULAR; INTRAVENOUS; SUBCUTANEOUS at 11:03

## 2020-01-01 RX ADMIN — PIPERACILLIN AND TAZOBACTAM 200 GRAM(S): 4; .5 INJECTION, POWDER, LYOPHILIZED, FOR SOLUTION INTRAVENOUS at 15:33

## 2020-01-01 RX ADMIN — Medication 109 MILLIGRAM(S): at 05:02

## 2020-01-01 RX ADMIN — PIPERACILLIN AND TAZOBACTAM 200 GRAM(S): 4; .5 INJECTION, POWDER, LYOPHILIZED, FOR SOLUTION INTRAVENOUS at 07:45

## 2020-01-01 RX ADMIN — SODIUM CHLORIDE 150 MILLILITER(S): 9 INJECTION INTRAMUSCULAR; INTRAVENOUS; SUBCUTANEOUS at 22:32

## 2020-01-01 RX ADMIN — Medication 50 MILLIGRAM(S): at 22:31

## 2020-01-01 RX ADMIN — PANTOPRAZOLE SODIUM 40 MILLIGRAM(S): 20 TABLET, DELAYED RELEASE ORAL at 12:54

## 2020-01-01 RX ADMIN — Medication 200 MILLIGRAM(S): at 05:37

## 2020-01-01 RX ADMIN — Medication 200 MILLIGRAM(S): at 05:47

## 2020-01-01 RX ADMIN — Medication 200 MILLIGRAM(S): at 18:49

## 2020-01-01 RX ADMIN — BRIMONIDINE TARTRATE 1 DROP(S): 2 SOLUTION/ DROPS OPHTHALMIC at 05:03

## 2020-01-01 RX ADMIN — LATANOPROST 1 DROP(S): 0.05 SOLUTION/ DROPS OPHTHALMIC; TOPICAL at 21:42

## 2020-01-01 RX ADMIN — SODIUM CHLORIDE 75 MILLILITER(S): 9 INJECTION, SOLUTION INTRAVENOUS at 22:16

## 2020-01-01 RX ADMIN — LEVETIRACETAM 750 MILLIGRAM(S): 250 TABLET, FILM COATED ORAL at 20:08

## 2020-01-01 RX ADMIN — SODIUM CHLORIDE 75 MILLILITER(S): 9 INJECTION, SOLUTION INTRAVENOUS at 11:16

## 2020-01-01 RX ADMIN — Medication 975 MILLIGRAM(S): at 07:55

## 2020-01-01 RX ADMIN — Medication 50 MILLIGRAM(S): at 21:41

## 2020-01-01 RX ADMIN — Medication 200 MILLIGRAM(S): at 05:32

## 2020-01-01 RX ADMIN — CHLORHEXIDINE GLUCONATE 1 APPLICATION(S): 213 SOLUTION TOPICAL at 13:00

## 2020-01-01 RX ADMIN — MIDODRINE HYDROCHLORIDE 10 MILLIGRAM(S): 2.5 TABLET ORAL at 17:02

## 2020-01-01 RX ADMIN — SENNA PLUS 2 TABLET(S): 8.6 TABLET ORAL at 21:50

## 2020-01-01 RX ADMIN — MIDODRINE HYDROCHLORIDE 10 MILLIGRAM(S): 2.5 TABLET ORAL at 05:06

## 2020-01-01 RX ADMIN — PANTOPRAZOLE SODIUM 40 MILLIGRAM(S): 20 TABLET, DELAYED RELEASE ORAL at 12:05

## 2020-01-01 RX ADMIN — LEVETIRACETAM 750 MILLIGRAM(S): 250 TABLET, FILM COATED ORAL at 18:01

## 2020-01-01 RX ADMIN — Medication 200 MILLIGRAM(S): at 20:04

## 2020-01-01 RX ADMIN — LATANOPROST 1 DROP(S): 0.05 SOLUTION/ DROPS OPHTHALMIC; TOPICAL at 22:40

## 2020-01-01 RX ADMIN — DORZOLAMIDE HYDROCHLORIDE 1 DROP(S): 20 SOLUTION/ DROPS OPHTHALMIC at 05:03

## 2020-01-01 RX ADMIN — SODIUM CHLORIDE 75 MILLILITER(S): 9 INJECTION, SOLUTION INTRAVENOUS at 05:04

## 2020-01-01 RX ADMIN — CEFTRIAXONE 100 MILLIGRAM(S): 500 INJECTION, POWDER, FOR SOLUTION INTRAMUSCULAR; INTRAVENOUS at 13:49

## 2020-01-01 RX ADMIN — MIDODRINE HYDROCHLORIDE 10 MILLIGRAM(S): 2.5 TABLET ORAL at 12:54

## 2020-01-01 RX ADMIN — SENNA PLUS 2 TABLET(S): 8.6 TABLET ORAL at 22:23

## 2020-01-01 RX ADMIN — CHLORHEXIDINE GLUCONATE 1 APPLICATION(S): 213 SOLUTION TOPICAL at 06:36

## 2020-01-01 RX ADMIN — ENOXAPARIN SODIUM 40 MILLIGRAM(S): 100 INJECTION SUBCUTANEOUS at 12:53

## 2020-01-01 RX ADMIN — SODIUM CHLORIDE 1000 MILLILITER(S): 9 INJECTION INTRAMUSCULAR; INTRAVENOUS; SUBCUTANEOUS at 08:19

## 2020-01-01 RX ADMIN — Medication 110 MILLIGRAM(S): at 05:02

## 2020-01-01 RX ADMIN — Medication 400 MILLIGRAM(S): at 18:42

## 2020-01-01 RX ADMIN — Medication 250 MILLIGRAM(S): at 08:18

## 2020-01-01 RX ADMIN — LEVETIRACETAM 750 MILLIGRAM(S): 250 TABLET, FILM COATED ORAL at 05:03

## 2020-07-10 NOTE — H&P ADULT - NSICDXFAMILYHX_GEN_ALL_CORE_FT
FAMILY HISTORY:  Sibling  Still living? Yes, Estimated age: 71-80  Family history of diabetes mellitus, Age at diagnosis: Age Unknown

## 2020-07-10 NOTE — DIETITIAN INITIAL EVALUATION ADULT. - ETIOLOGY
Inadequate energy/ protein intake; increased energy/protein needs related to Multiple Myeloma; Prostate Cancer

## 2020-07-10 NOTE — ED ADULT NURSE NOTE - OBJECTIVE STATEMENT
Patient received as a code sepsis with AMS from home. Patient place immediately on cardiac monitor. Heplock inserted to to L/ AC, LR infusing bolus as per MD order. Blood collected and sent to lab. stage 2 to sacrum. dressing applied to site.

## 2020-07-10 NOTE — CONSULT NOTE ADULT - PROBLEM SELECTOR RECOMMENDATION 9
- called patients wife, in chart, person on other line says wrong number, and not related to patient  - D/w patients Oncologist Dr Marlow, was on [place], was on selinxor recently, [place] can interfere with Cross matching  - spoke with blood bank they spoke to [facility] and are trying to order appropriate blood for patient as soon as possible  - ICU evaluation

## 2020-07-10 NOTE — H&P ADULT - ASSESSMENT
84 year old male PMH HTN, mm and prostate ca p/w sepsis, likely urosepsis. Patient developed cough and fever last night and this morning woke up confused and altered mental status. EMS called this morning, they were unable to get iv and found him hypotensive. Given IVF in ER.   Lactate 2.1 on arrival. UA appeared infected.     ID: 84 year old male PMH HTN, mm and prostate ca p/w sepsis, likely urosepsis. Patient developed cough and fever last night and this morning woke up confused and altered mental status. EMS called this morning, they were unable to get iv and found him hypotensive. Given IVF in ER.   Lactate 2.1 on arrival. UA appeared infected.     ID: UA appears infected. Follow-up blood and urine cultures. COVID-19 nasal swab pending. Started IV Ceftriaxone for presumed   UTI. CXR is clear. Lactate 2.1. Sepsis present on arrival. Continue Acyclovir 400 bid for Zoster prevention.      Heme: History of prostate cancer and Multiple myloma. HGB low on arrival 7.0. Blood bank needs type and screen prior to transfusion.   PRBC written.  Added stress dose steroids given sepsis and use of decadron weekly at home.      Called wife [phone] to review PMH, no one home, will call again. 84 year old male PMH HTN, mm and prostate ca p/w sepsis, likely urosepsis. Patient developed cough and fever last night and this morning woke up confused and altered mental status. EMS called this morning, they were unable to get iv and found him hypotensive. Given IVF in ER.   Lactate 2.1 on arrival. UA appeared infected.     ID: UA appears infected. Follow-up blood and urine cultures. COVID-19 nasal swab pending. Started IV Ceftriaxone for presumed   UTI. CXR is clear. Lactate 2.1. Sepsis present on arrival. Continue Acyclovir 400 bid for Zoster prevention.      Heme: History of prostate cancer and Multiple myloma. HGB low on arrival 7.0. Blood bank needs type and screen prior to transfusion.   PRBC written.  Added stress dose steroids given sepsis and use of decadron weekly at home.      Called wife [phone], chemo regime is weekly. Oncologist does not come here, will ask oncology to follow here.

## 2020-07-10 NOTE — ED PROVIDER NOTE - OBJECTIVE STATEMENT
84M hx mm and prostate ca pw sepsis. pt developed cough and fever last night and this morning woke up confused and altered. ems called this morning, they were unable to get iv and found him hypotensive

## 2020-07-10 NOTE — CONSULT NOTE ADULT - SUBJECTIVE AND OBJECTIVE BOX
84 year old male PMH HTN, Multiple Myeloma, and prostate ca p/w sepsis, likely urosepsis. Patient developed cough and fever last night and this morning woke up confused and altered mental status. EMS called this morning, they were unable to get iv and found him hypotensive. Given IVF (3L LR) in ER but BP was not responsive at that time. Patients UA on admission positive. ICU consulted for persistent hypotension despite fluid resuscitation. On exam, patient noted to be lying in bed in NAD. Noted to be hypotensive with SBP in 80s.  Patient admits to increased urinary frequency. Patient denied any complaints including fevers, chills, headaches, chest pain, shortness of breath, abdominal pain, N/V/D, dysuria    MEDICATIONS  (STANDING):  acyclovir   Oral Tab/Cap 400 milliGRAM(s) Oral two times a day  brimonidine 0.2% Ophthalmic Solution 1 Drop(s) Left EYE two times a day  cefTRIAXone   IVPB 1000 milliGRAM(s) IV Intermittent every 24 hours  chlorhexidine 4% Liquid 1 Application(s) Topical <User Schedule>  dorzolamide 2% Ophthalmic Solution 1 Drop(s) Left EYE <User Schedule>  enoxaparin Injectable 40 milliGRAM(s) SubCutaneous daily  hydrocortisone sodium succinate Injectable 100 milliGRAM(s) IV Push every 8 hours  latanoprost 0.005% Ophthalmic Solution 1 Drop(s) Both EYES at bedtime  midodrine. 10 milliGRAM(s) Oral three times a day  pantoprazole  Injectable 40 milliGRAM(s) IV Push daily  sodium chloride 0.9% Bolus 500 milliLiter(s) IV Bolus once  sodium chloride 0.9%. 1000 milliLiter(s) (150 mL/Hr) IV Continuous <Continuous>      Daily Height in cm: 180.34 (10 Jul 2020 07:16)    Daily   Drug Dosing Weight  Height (cm): 180.34 (10 Jul 2020 07:16)  Weight (kg): 59 (10 Jul 2020 07:16)  BMI (kg/m2): 18.1 (10 Jul 2020 07:16)  BSA (m2): 1.76 (10 Jul 2020 07:16)    PAST MEDICAL & SURGICAL HISTORY:  Hypertension  Urinary incontinence  Radiation cystitis  Prostate cancer  S/P TURP      FAMILY HISTORY:  Family history of diabetes mellitus (Sibling)      SOCIAL HISTORY: Denies smoking, ETOH, illicit drug use     ADVANCE DIRECTIVES: FULL CODE     REVIEW OF SYSTEMS:  CONSTITUTIONAL: No fever, weight loss, or fatigue  EYES: No eye pain, visual disturbances, or discharge  ENMT:  No difficulty hearing, tinnitus, vertigo; No sinus or throat pain  NECK: No pain or stiffness  BREASTS: No pain, masses, or nipple discharge  RESPIRATORY: No cough, wheezing, chills or hemoptysis; No shortness of breath  CARDIOVASCULAR: No chest pain, palpitations, dizziness, or leg swelling  GASTROINTESTINAL: No abdominal or epigastric pain. No nausea, vomiting, or hematemesis; No diarrhea or constipation. No melena or hematochezia.  GENITOURINARY: No dysuria, frequency, hematuria, or incontinence  NEUROLOGICAL: No headaches, memory loss, loss of strength, numbness, or tremors  SKIN: No itching, burning, rashes, or lesions   LYMPH NODES: No enlarged glands  ENDOCRINE: No heat or cold intolerance; No hair loss  MUSCULOSKELETAL: No joint pain or swelling; No muscle, back, or extremity pain  PSYCHIATRIC: No depression, anxiety, mood swings, or difficulty sleeping    ICU Vital Signs Last 24 Hrs  T(C): 35.7 (10 Jul 2020 12:46), Max: 38.3 (10 Jul 2020 07:16)  T(F): 96.3 (10 Jul 2020 12:46), Max: 101 (10 Jul 2020 07:16)  HR: 86 (10 Jul 2020 12:46) (84 - 116)  BP: 100/64 (10 Jul 2020 12:46) (64/42 - 113/67)  RR: 13 (10 Jul 2020 12:46) (12 - 16)  SpO2: 99% (10 Jul 2020 12:46) (97% - 99%)      ABG - ( 10 Jul 2020 08:20 )  pH, Arterial: x     pH, Blood: 7.49  /  pCO2: 31    /  pO2: 82    / HCO3: 24    / Base Excess: 0.7   /  SaO2: 96          I&O's Detail    10 Jul 2020 07:01  -  10 Jul 2020 13:43  --------------------------------------------------------  IN:    Sodium Chloride 0.9% IV Bolus: 500 mL  Total IN: 500 mL    OUT:  Total OUT: 0 mL    Total NET: 500 mL          PHYSICAL EXAM:    GENERAL: NAD, well-groomed, cachectic   HEAD:  Atraumatic, Normocephalic  EYES: EOMI, PERRLA, conjunctiva and sclera clear  ENMT: No tonsillar erythema, exudates, or enlargement; Moist mucous membranes, Good dentition, No lesions  NECK: Supple, No JVD, Normal thyroid  NERVOUS SYSTEM:  Alert & Oriented X3, Good concentration; Motor Strength 5/5 B/L upper and lower extremities; DTRs 2+ intact and symmetric  CHEST/LUNG: Clear to percussion bilaterally; No rales, rhonchi, wheezing, or rubs  HEART: Regular rate and rhythm; No murmurs, rubs, or gallops  ABDOMEN: Soft, Nontender, Nondistended; Bowel sounds present  EXTREMITIES:  2+ Peripheral Pulses, No clubbing, cyanosis, or edema  LYMPH: No lymphadenopathy noted  SKIN: No rashes or lesions    LABS:  CBC Full  -  ( 10 Jul 2020 07:43 )  WBC Count : 8.18 K/uL  RBC Count : 2.25 M/uL  Hemoglobin : 7.0 g/dL  Hematocrit : 21.9 %  Platelet Count - Automated : 75 K/uL  Mean Cell Volume : 97.3 fl  Mean Cell Hemoglobin : 31.1 pg  Mean Cell Hemoglobin Concentration : 32.0 gm/dL  Auto Neutrophil # : 5.81 K/uL  Auto Lymphocyte # : 2.29 K/uL  Auto Monocyte # : 0.08 K/uL  Auto Eosinophil # : 0.00 K/uL  Auto Basophil # : 0.00 K/uL  Auto Neutrophil % : 70.0 %  Auto Lymphocyte % : 28.0 %  Auto Monocyte % : 1.0 %  Auto Eosinophil % : 0.0 %  Auto Basophil % : 0.0 %    07-10    138  |  104  |  16  ----------------------------<  90  3.6   |  24  |  1.02    Ca    8.2<L>      10 Jul 2020 07:43  Mg     2.3     07-10    TPro  7.8  /  Alb  1.4<L>  /  TBili  0.5  /  DBili  x   /  AST  15  /  ALT  8<L>  /  AlkPhos  51  07-10    CAPILLARY BLOOD GLUCOSE        PT/INR - ( 10 Jul 2020 07:43 )   PT: 20.1 sec;   INR: 1.87 ratio         PTT - ( 10 Jul 2020 07:43 )  PTT:36.0 sec  Urinalysis Basic - ( 10 Jul 2020 08:08 )    Color: Yellow / Appearance: very cloudy / S.005 / pH: x  Gluc: x / Ketone: Negative  / Bili: Negative / Urobili: Negative mg/dL   Blood: x / Protein: 500 mg/dL / Nitrite: Negative   Leuk Esterase: Moderate / RBC: 11-25 /HPF / WBC >50   Sq Epi: x / Non Sq Epi: Moderate / Bacteria: x      CARDIAC MARKERS ( 10 Jul 2020 07:43 )  .037 ng/mL / x     / x     / x     / x 84 year old male PMH HTN, Multiple Myeloma, and prostate ca p/w sepsis, likely urosepsis. Patient developed cough and fever last night and this morning woke up confused and altered mental status. EMS called this morning, they were unable to get iv and found him hypotensive. Given IVF (3L LR) in ER but BP was not responsive at that time. Patients UA on admission positive. ICU consulted for persistent hypotension despite fluid resuscitation. On exam, patient noted to be lying in bed in NAD. Noted to be hypotensive with SBP in 80s.  Patient admits to increased urinary frequency. Patient denied any complaints including fevers, chills, headaches, chest pain, shortness of breath, abdominal pain, N/V/D, dysuria    MEDICATIONS  (STANDING):  acyclovir   Oral Tab/Cap 400 milliGRAM(s) Oral two times a day  brimonidine 0.2% Ophthalmic Solution 1 Drop(s) Left EYE two times a day  cefTRIAXone   IVPB 1000 milliGRAM(s) IV Intermittent every 24 hours  chlorhexidine 4% Liquid 1 Application(s) Topical <User Schedule>  dorzolamide 2% Ophthalmic Solution 1 Drop(s) Left EYE <User Schedule>  enoxaparin Injectable 40 milliGRAM(s) SubCutaneous daily  hydrocortisone sodium succinate Injectable 100 milliGRAM(s) IV Push every 8 hours  latanoprost 0.005% Ophthalmic Solution 1 Drop(s) Both EYES at bedtime  midodrine. 10 milliGRAM(s) Oral three times a day  pantoprazole  Injectable 40 milliGRAM(s) IV Push daily  sodium chloride 0.9% Bolus 500 milliLiter(s) IV Bolus once  sodium chloride 0.9%. 1000 milliLiter(s) (150 mL/Hr) IV Continuous <Continuous>      Daily Height in cm: 180.34 (10 Jul 2020 07:16)    Daily   Drug Dosing Weight  Height (cm): 180.34 (10 Jul 2020 07:16)  Weight (kg): 59 (10 Jul 2020 07:16)  BMI (kg/m2): 18.1 (10 Jul 2020 07:16)  BSA (m2): 1.76 (10 Jul 2020 07:16)    PAST MEDICAL & SURGICAL HISTORY:  Hypertension  Urinary incontinence  Radiation cystitis  Prostate cancer  S/P TURP      FAMILY HISTORY:  Family history of diabetes mellitus (Sibling)      SOCIAL HISTORY: Denies smoking, ETOH, illicit drug use     ADVANCE DIRECTIVES: FULL CODE     REVIEW OF SYSTEMS:  CONSTITUTIONAL: No fever, weight loss, or fatigue  EYES: No eye pain, visual disturbances, or discharge  ENMT:  No difficulty hearing, tinnitus, vertigo; No sinus or throat pain  NECK: No pain or stiffness  BREASTS: No pain, masses, or nipple discharge  RESPIRATORY: No cough, wheezing, chills or hemoptysis; No shortness of breath  CARDIOVASCULAR: No chest pain, palpitations, dizziness, or leg swelling  GASTROINTESTINAL: No abdominal or epigastric pain. No nausea, vomiting, or hematemesis; No diarrhea or constipation. No melena or hematochezia.  GENITOURINARY: No dysuria, frequency, hematuria, or incontinence  NEUROLOGICAL: No headaches, memory loss, loss of strength, numbness, or tremors  SKIN: No itching, burning, rashes, or lesions   LYMPH NODES: No enlarged glands  ENDOCRINE: No heat or cold intolerance; No hair loss  MUSCULOSKELETAL: No joint pain or swelling; No muscle, back, or extremity pain  PSYCHIATRIC: No depression, anxiety, mood swings, or difficulty sleeping    ICU Vital Signs Last 24 Hrs  T(C): 35.7 (10 Jul 2020 12:46), Max: 38.3 (10 Jul 2020 07:16)  T(F): 96.3 (10 Jul 2020 12:46), Max: 101 (10 Jul 2020 07:16)  HR: 86 (10 Jul 2020 12:46) (84 - 116)  BP: 100/64 (10 Jul 2020 12:46) (64/42 - 113/67)  RR: 13 (10 Jul 2020 12:46) (12 - 16)  SpO2: 99% (10 Jul 2020 12:46) (97% - 99%)      ABG - ( 10 Jul 2020 08:20 )  pH, Arterial: x     pH, Blood: 7.49  /  pCO2: 31    /  pO2: 82    / HCO3: 24    / Base Excess: 0.7   /  SaO2: 96          I&O's Detail    10 Jul 2020 07:01  -  10 Jul 2020 13:43  --------------------------------------------------------  IN:    Sodium Chloride 0.9% IV Bolus: 500 mL  Total IN: 500 mL    OUT:  Total OUT: 0 mL    Total NET: 500 mL          PHYSICAL EXAM:    GENERAL: NAD, well-groomed, cachectic   HEAD:  Atraumatic, Normocephalic  EYES: EOMI, PERRLA, conjunctiva and sclera clear  ENMT: No tonsillar erythema, exudates, or enlargement; Moist mucous membranes, Good dentition, No lesions  NECK: Supple, No JVD, Normal thyroid  NERVOUS SYSTEM:  Alert & Oriented X3, Good concentration; Motor Strength 5/5 B/L upper and lower extremities; DTRs 2+ intact and symmetric  CHEST/LUNG: Clear to percussion bilaterally; No rales, rhonchi, wheezing, or rubs  HEART: Regular rate and rhythm; No murmurs, rubs, or gallops  ABDOMEN: Soft, Nontender, Nondistended; Bowel sounds present  EXTREMITIES:  2+ Peripheral Pulses, No clubbing, cyanosis, or edema  LYMPH: No lymphadenopathy noted  SKIN: No rashes or lesions    LABS:  CBC Full  -  ( 10 Jul 2020 07:43 )  WBC Count : 8.18 K/uL  RBC Count : 2.25 M/uL  Hemoglobin : 7.0 g/dL  Hematocrit : 21.9 %  Platelet Count - Automated : 75 K/uL  Mean Cell Volume : 97.3 fl  Mean Cell Hemoglobin : 31.1 pg  Mean Cell Hemoglobin Concentration : 32.0 gm/dL  Auto Neutrophil # : 5.81 K/uL  Auto Lymphocyte # : 2.29 K/uL  Auto Monocyte # : 0.08 K/uL  Auto Eosinophil # : 0.00 K/uL  Auto Basophil # : 0.00 K/uL  Auto Neutrophil % : 70.0 %  Auto Lymphocyte % : 28.0 %  Auto Monocyte % : 1.0 %  Auto Eosinophil % : 0.0 %  Auto Basophil % : 0.0 %    07-10    138  |  104  |  16  ----------------------------<  90  3.6   |  24  |  1.02    Ca    8.2<L>      10 Jul 2020 07:43  Mg     2.3     07-10    TPro  7.8  /  Alb  1.4<L>  /  TBili  0.5  /  DBili  x   /  AST  15  /  ALT  8<L>  /  AlkPhos  51  07-10      PT/INR - ( 10 Jul 2020 07:43 )   PT: 20.1 sec;   INR: 1.87 ratio    PTT - ( 10 Jul 2020 07:43 )  PTT:36.0 sec      Urinalysis Basic - ( 10 Jul 2020 08:08 )    Color: Yellow / Appearance: very cloudy / S.005 / pH: x  Gluc: x / Ketone: Negative  / Bili: Negative / Urobili: Negative mg/dL   Blood: x / Protein: 500 mg/dL / Nitrite: Negative   Leuk Esterase: Moderate / RBC: 11-25 /HPF / WBC >50   Sq Epi: x / Non Sq Epi: Moderate / Bacteria: x      CARDIAC MARKERS ( 10 Jul 2020 07:43 )  0.037 ng/mL: trop

## 2020-07-10 NOTE — H&P ADULT - NSICDXPASTMEDICALHX_GEN_ALL_CORE_FT
PAST MEDICAL HISTORY:  Hypertension     Prostate cancer     Radiation cystitis     Urinary incontinence

## 2020-07-10 NOTE — ED ADULT NURSE REASSESSMENT NOTE - NS ED NURSE REASSESS COMMENT FT1
Nurse called lab for confirmation, as per [facility]  blood not ready to dispense. Lab will notify nurse when ready MD Horowitz and BONNIE Covington made aware. details…

## 2020-07-10 NOTE — DIETITIAN INITIAL EVALUATION ADULT. - PERTINENT LABORATORY DATA
07-10 Na138 mmol/L Glu 90 mg/dL K+ 3.6 mmol/L Cr  1.02 mg/dL BUN 16 mg/dL 07-10 Alb 1.4 g/dL<L>07-10 ALT 8 U/L<L> AST 15 U/L Alkaline Phosphatase 51 U/L

## 2020-07-10 NOTE — H&P ADULT - NSHPPHYSICALEXAM_GEN_ALL_CORE
PHYSICAL EXAMINATION:  Vital Signs Last 24 Hrs  T(C): 38.3 (10 Jul 2020 07:16), Max: 38.3 (10 Jul 2020 07:16)  T(F): 101 (10 Jul 2020 07:16), Max: 101 (10 Jul 2020 07:16)  HR: 101 (10 Jul 2020 08:14) (99 - 116)  BP: 113/67 (10 Jul 2020 08:14) (64/42 - 113/67)  BP(mean): --  RR: 15 (10 Jul 2020 08:14) (12 - 16)  SpO2: 99% (10 Jul 2020 08:14) (97% - 99%)  CAPILLARY BLOOD GLUCOSE          GENERAL: NAD, well-groomed, well-developed  HEAD:  atraumatic, normocephalic  EYES: sclera anicteric  ENMT: mucous membranes moist  NECK: supple, No JVD  CHEST/LUNG: clear to auscultation bilaterally; no rales, rhonchi, or wheezing b/l  HEART: normal S1, S2  ABDOMEN: BS+, soft, ND, NT   EXTREMITIES:  pulses palpable; no clubbing, cyanosis, or edema b/l LEs  NEURO: awake, alert, interactive; moves all extremities  SKIN: no rashes or lesions PHYSICAL EXAMINATION:  Vital Signs Last 24 Hrs  T(C): 38.3 (10 Jul 2020 07:16), Max: 38.3 (10 Jul 2020 07:16)  T(F): 101 (10 Jul 2020 07:16), Max: 101 (10 Jul 2020 07:16)  HR: 101 (10 Jul 2020 08:14) (99 - 116)  BP: 113/67 (10 Jul 2020 08:14) (64/42 - 113/67)  BP(mean): --  RR: 15 (10 Jul 2020 08:14) (12 - 16)  SpO2: 99% (10 Jul 2020 08:14) (97% - 99%)  CAPILLARY BLOOD GLUCOSE          GENERAL: NAD, seen in ER, no cough, SOB, lethargic  HEAD:  atraumatic, normocephalic  EYES: sclera anicteric  ENMT: mucous membranes moist  NECK: supple, No JVD  CHEST/LUNG: clear to auscultation bilaterally; no rales, rhonchi, or wheezing b/l  HEART: normal S1, S2  ABDOMEN: BS+, soft, ND, NT   EXTREMITIES:  pulses palpable; no clubbing, cyanosis, or edema b/l LEs  NEURO: awake, alert, interactive; moves all extremities  SKIN: no rashes or lesions

## 2020-07-10 NOTE — DIETITIAN INITIAL EVALUATION ADULT. - PERTINENT MEDS FT
MEDICATIONS  (STANDING):  acyclovir   Oral Tab/Cap 400 milliGRAM(s) Oral two times a day  brimonidine 0.2% Ophthalmic Solution 1 Drop(s) Left EYE two times a day  cefTRIAXone   IVPB 1000 milliGRAM(s) IV Intermittent every 24 hours  chlorhexidine 4% Liquid 1 Application(s) Topical <User Schedule>  dorzolamide 2% Ophthalmic Solution 1 Drop(s) Left EYE <User Schedule>  enoxaparin Injectable 40 milliGRAM(s) SubCutaneous daily  hydrocortisone sodium succinate Injectable 100 milliGRAM(s) IV Push every 8 hours  latanoprost 0.005% Ophthalmic Solution 1 Drop(s) Both EYES at bedtime  midodrine. 10 milliGRAM(s) Oral three times a day  pantoprazole  Injectable 40 milliGRAM(s) IV Push daily  sodium chloride 0.9% Bolus 500 milliLiter(s) IV Bolus once  sodium chloride 0.9%. 1000 milliLiter(s) (150 mL/Hr) IV Continuous <Continuous>    MEDICATIONS  (PRN):

## 2020-07-10 NOTE — ED ADULT TRIAGE NOTE - CHIEF COMPLAINT QUOTE
cough and fever since last night with AMS this am. He has a history of Prostate Cancer and Multiple myeloma. He was tested for COVID in March negative and no sick contacts

## 2020-07-10 NOTE — ED PROVIDER NOTE - CLINICAL SUMMARY MEDICAL DECISION MAKING FREE TEXT BOX
sepsis. pt likely with pna sepsis. will start on abx and fluid resuscitate given the hypotension. will consider covid. difficulty getting o2 sat, will need blood gas  I read ekg as nsr rate 98 with rbbb and lafb, t inversions laterally, qtc 548. sepsis. pt likely with pna sepsis. will start on abx and fluid resuscitate given the hypotension. will consider covid. difficulty getting o2 sat, will need blood gas  I read ekg as nsr rate 98 with rbbb and lafb, t inversions laterally, qtc 548.  pt with notable anemia. likely from mm. will xfuse

## 2020-07-10 NOTE — ED PROVIDER NOTE - CARE PLAN
Principal Discharge DX:	Sepsis, due to unspecified organism, unspecified whether acute organ dysfunction present Principal Discharge DX:	Sepsis, due to unspecified organism, unspecified whether acute organ dysfunction present  Secondary Diagnosis:	Iron deficiency anemia due to chronic blood loss

## 2020-07-10 NOTE — ED PROVIDER NOTE - PHYSICAL EXAMINATION
Gen: delirium   Head: NC, AT   Eyes: PERRL, EOMI, normal lids/conjunctiva  ENT: dry mm  Neck: supple, no tenderness, Trachea midline  Pulm: diminished bl  CV: RRR, no M/R/G, 2+ radial and dp pulses bl, no edema  Abd: soft, NT/ND, +BS, no hepatosplenomegaly  Mskel: extremities x4 with normal ROM and no joint effusions. no ctl spine ttp.   Skin: no rash, no bruising   Neuro: oriented to self only. following simple commands intermittently

## 2020-07-10 NOTE — ED ADULT NURSE NOTE - NSIMPLEMENTINTERV_GEN_ALL_ED
Implemented All Fall Risk Interventions:  [place] to call system. Call bell, personal items and telephone within reach. Instruct patient to call for assistance. Room bathroom lighting operational. Non-slip footwear when patient is off stretcher. Physically safe environment: no spills, clutter or unnecessary equipment. Stretcher in lowest position, wheels locked, appropriate side rails in place. Provide visual cue, wrist band, yellow gown, etc. Monitor gait and stability. Monitor for mental status changes and reorient to person, place, and time. Review medications for side effects contributing to fall risk. Reinforce activity limits and safety measures with patient and family.

## 2020-07-10 NOTE — CONSULT NOTE ADULT - ATTENDING COMMENTS
84M PMH HTN, Multiple Myeloma, and prostate ca  s/p RT, radiation cystitis, radiation proctitis, urinary incontinence, upper GI bleed, hx of UTI presents for fever, AMS. Pt reports urinary frequency without complaints of dysuria. Per chart pt with cough however upon interview pt denies cough. UA + Lactate 2.3, Hb 7.0. Admitted to medical service for sepsis, UTI. Pt became progressively hypotensive SBP 70-80s despite 4L IVF bolus. Will transfer to ICU for sepsis, septic shock secondary to urinary source of infection (UTI)    - start midodrine 10mg q8 hrs  - BP remains borderline low still dipping into the 80s post midodrine  - will start levophed for BP support  - stress dose steroids as pt is on dexamethasone as output  - pt wife reports pt to have allergy to meropenem (rash), received ceftriaxone without any reactions. wife and pt informed of small possibility of cross reactivity, however currently tolerated ceftriaxone without an problems. will observe and monitor. alternative would be to give a fluoroquinolone for UTI if pt unable to tolerate ceftriaxone  - follow up blood cx, u cx

## 2020-07-10 NOTE — H&P ADULT - HISTORY OF PRESENT ILLNESS
84 year old male PMH HTN, mm and prostate ca p/w sepsis, likely urosepsis. Patient developed cough and fever last night and this morning woke up confused and altered mental status. EMS called this morning, they were unable to get iv and found him hypotensive. Given IVF in ER.   Lactate 2.1 on arrival. UA appeared infected. 84 year old male PMH HTN, mm and prostate ca p/w sepsis, likely urosepsis. Patient developed cough and fever last night and this morning woke up confused and altered mental status. EMS called this morning, they were unable to get iv and found him hypotensive. Given IVF in ER.   Lactate 2.1 on arrival. UA appeared infected.  Sepsis present on arrival. 84 year old male PMH HTN, Multiple Myeloma, and prostate ca p/w sepsis, likely urosepsis. Patient developed cough and fever last night and this morning woke up confused and altered mental status. EMS called this morning, they were unable to get iv and found him hypotensive. Given IVF in ER.   Lactate 2.1 on arrival. UA appeared infected.  Sepsis present on arrival.

## 2020-07-10 NOTE — DIETITIAN INITIAL EVALUATION ADULT. - DIET TYPE
7/10 - NPO except medications NPO with tube feedings/jevity 1.2 @ 30 ml/hr advance as tolerated to goal rate 70 ml/hr = 1680 ml, 2016 kcal, 92 g pro, 1356 ml free water, 140 % RDI's as medically feasible

## 2020-07-10 NOTE — CONSULT NOTE ADULT - ASSESSMENT
84 year old male PMH HTN, Multiple Myeloma, and prostate ca p/w sepsis, who is BIBEMS after was noted by wife to have AMS. Patient states that he has been having cough and fevers since last night. States that he has increased urinary frequency at this time. ED labs reveal positive UA, likely sepsis 2/2 UTI. Patient noted to be hypotensive and was adequately resuscitated with IVF but with no improvement in BP. ICU consulted for hypotension. Patient seen and examined by ICU attending.  Will admit the patient to the ICU for continued monitoring of BP and possible pressor support if warranted.      Neuro:  -- Stable at this time. Answering appropriately. Will monitor for acute changes in MS.     Cardio:  -- Hypotensive despite 4L of IVF  -- Start Midodrine 10 TID  -- Monitor BP, Start pressors as needed     Resp:  -- Stable at this time on 2L NC     GI:   -- Stable at this time  -- NPO   -- Protonix 40 IVP QD    Renal:  -- Cr stable at this time. Continue to monitor UOP    Fluids/Electrolytes/Nutrition:  -- S/P 4L NS boluses,  -- NS @ 150 maintenance    Infectious Disease:  -- UA positive. Start Rocephin IV. Follow up culture results and adjust abx as needed  -- Follow up Blood/Sputum cx    Heme:  -- Hgb 7.0 on admission. Will continue to monitor. Transfuse as needed  -- T&S pending     Endocrine:  -- FS Q6 while NPO     :  -- Current Prostate CA--> Dr. Jaclyn Zhao-Onc following      --Admit

## 2020-07-10 NOTE — DIETITIAN INITIAL EVALUATION ADULT. - OTHER INFO
Pt in ICU ; Confused w/ AMS. COVID 19 nasal swab pending ( on chemo PTA on/off x many years). PTA pt would drink Ensure plus 1->3 cans / day depending on p.o. intake due to chemo. PTA pt also followed a Regular , low salt diet and needs to be fed ALL meals. Pt lives w/ his wife, she does the food shopping / cooking. Pt without difficulty chewing / swallowing PTA.     In accordance w/ current standards limiting patient contact - unable to perform physical assessment at this time.

## 2020-07-10 NOTE — DIETITIAN INITIAL EVALUATION ADULT. - ADD RECOMMEND
Advance to Regular, low salt if pt can tolerate p.o. foods. Advance to Regular, if pt can tolerate p.o. foods.

## 2020-07-10 NOTE — CHART NOTE - NSCHARTNOTEFT_GEN_A_CORE
Still hypotensive after third liter of fluid. Normal saline increased to 150/h, Strees dose steroids to be give, blood needs to be crossmatched.     ICU was called to eval and will see patient.

## 2020-07-10 NOTE — H&P ADULT - NSHPLABSRESULTS_GEN_ALL_CORE
LABS:                        7.0    8.18  )-----------( 75       ( 10 Jul 2020 07:43 )             21.9     07-10    138  |  104  |  16  ----------------------------<  90  3.6   |  24  |  1.02    Ca    8.2<L>      10 Jul 2020 07:43  Mg     2.3     07-10    TPro  7.8  /  Alb  1.4<L>  /  TBili  0.5  /  DBili  x   /  AST  15  /  ALT  8<L>  /  AlkPhos  51  07-10    PT/INR - ( 10 Jul 2020 07:43 )   PT: 20.1 sec;   INR: 1.87 ratio         PTT - ( 10 Jul 2020 07:43 )  PTT:36.0 sec  Urinalysis Basic - ( 10 Jul 2020 08:08 )    Color: Yellow / Appearance: very cloudy / S.005 / pH: x  Gluc: x / Ketone: Negative  / Bili: Negative / Urobili: Negative mg/dL   Blood: x / Protein: 500 mg/dL / Nitrite: Negative   Leuk Esterase: Moderate / RBC: 11-25 /HPF / WBC >50   Sq Epi: x / Non Sq Epi: Moderate / Bacteria: x          RADIOLOGY & ADDITIONAL TESTS:

## 2020-07-10 NOTE — CONSULT NOTE ADULT - SUBJECTIVE AND OBJECTIVE BOX
Reason for Consultation: Multiple Myeloma    HPI: Patient is a 84y Male seen on consultatioin for the evaluation and management of Multiple Myeloma    84 year old male PMH HTN, Multiple Myeloma, and prostate ca p/w sepsis, likely urosepsis. Patient developed cough and fever last night and this morning woke up confused and altered mental status. EMS called this morning, they were unable to get iv and found him hypotensive. Given IVF in ER.   Lactate 2.1 on arrival. UA appeared infected.  Sepsis present on arrival.        PAST MEDICAL & SURGICAL HISTORY:  Hypertension  Urinary incontinence  Radiation cystitis  Prostate cancer  S/P TURP      MEDICATIONS  (STANDING):  acyclovir   Oral Tab/Cap 400 milliGRAM(s) Oral two times a day  brimonidine 0.2% Ophthalmic Solution 1 Drop(s) Left EYE two times a day  cefTRIAXone   IVPB 1000 milliGRAM(s) IV Intermittent every 24 hours  dorzolamide 2% Ophthalmic Solution 1 Drop(s) Left EYE <User Schedule>  hydrocortisone sodium succinate Injectable 100 milliGRAM(s) IV Push every 8 hours  latanoprost 0.005% Ophthalmic Solution 1 Drop(s) Both EYES at bedtime  midodrine. 10 milliGRAM(s) Oral three times a day  pantoprazole  Injectable 40 milliGRAM(s) IV Push daily  sodium chloride 0.9% Bolus 500 milliLiter(s) IV Bolus once  sodium chloride 0.9%. 1000 milliLiter(s) (150 mL/Hr) IV Continuous <Continuous>    MEDICATIONS  (PRN):      Allergies    Bactrim (Swelling)    Intolerances        SOCIAL HISTORY:    Smoking Status: no  Alcohol: no  Marital Status: yes   Occupation: no    FAMILY HISTORY:  Family history of diabetes mellitus (Sibling)      Vital Signs Last 24 Hrs  T(C): 37.7 (10 Jul 2020 09:50), Max: 38.3 (10 Jul 2020 07:16)  T(F): 99.8 (10 Jul 2020 09:50), Max: 101 (10 Jul 2020 07:16)  HR: 90 (10 Jul 2020 09:50) (90 - 116)  BP: 93/57 (10 Jul 2020 09:50) (64/42 - 113/67)  BP(mean): --  RR: 14 (10 Jul 2020 09:50) (12 - 16)  SpO2: 98% (10 Jul 2020 09:50) (97% - 99%)    PHYSICAL EXAM:    general - weak looking +  HEENT - No Icterus  CVS - RRR  RS - AE B/L  Abd - soft, NT  Ext - Pulses +        LABS:                        7.0    8.18  )-----------( 75       ( 10 Jul 2020 07:43 )             21.9     07-10    138  |  104  |  16  ----------------------------<  90  3.6   |  24  |  1.02    Ca    8.2<L>      10 Jul 2020 07:43  Mg     2.3     07-10    TPro  7.8  /  Alb  1.4<L>  /  TBili  0.5  /  DBili  x   /  AST  15  /  ALT  8<L>  /  AlkPhos  51  07-10    PT/INR - ( 10 Jul 2020 07:43 )   PT: 20.1 sec;   INR: 1.87 ratio         PTT - ( 10 Jul 2020 07:43 )  PTT:36.0 sec  Urinalysis Basic - ( 10 Jul 2020 08:08 )    Color: Yellow / Appearance: very cloudy / S.005 / pH: x  Gluc: x / Ketone: Negative  / Bili: Negative / Urobili: Negative mg/dL   Blood: x / Protein: 500 mg/dL / Nitrite: Negative   Leuk Esterase: Moderate / RBC: 11-25 /HPF / WBC >50   Sq Epi: x / Non Sq Epi: Moderate / Bacteria: x          RADIOLOGY & ADDITIONAL STUDIES:

## 2020-07-10 NOTE — DIETITIAN INITIAL EVALUATION ADULT. - ENTERAL
jevity 1.2 @ 30 ml/hr advance as tolerated to goal rate 70 ml/hr = 1680 ml, 2016 kcal, 92 g pro, 1356 ml free water, 140 % RDI's  as medically feasible

## 2020-07-10 NOTE — CHART NOTE - NSCHARTNOTEFT_GEN_A_CORE
Upon Nutritional Assessment by the Registered Dietitian your patient was determined to meet criteria / has evidence of the following diagnosis/diagnoses:          [ ]  Mild Protein Calorie Malnutrition        [ ]  Moderate Protein Calorie Malnutrition        [x ] Severe Protein Calorie Malnutrition        [ ] Unspecified Protein Calorie Malnutrition        [x ] Underweight / BMI <19        [ ] Morbid Obesity / BMI > 40      Findings as based on:  •  Comprehensive nutrition assessment and consultation  •  Calorie counts (nutrient intake analysis)  •  Food acceptance and intake status from observations by staff  •  Follow up  •  Patient education  •  Intervention secondary to interdisciplinary rounds  •   concerns      Treatment:    The following diet has been recommended: Advance diet as medically feasible -> jevity 1.2 @ 30 ml/hr advance as tolerated to goal rate 70 ml/hr = 1680 ml, 2016 kcal, 92 g pro, 1356 ml free water, 140 % RDI's. If pt can tolerate p.o. food, Recommend Regular diet as tolerated.       PROVIDER Section:     By signing this assessment you are acknowledging and agree with the diagnosis/diagnoses assigned by the Registered Dietitian    Comments:

## 2020-07-11 NOTE — PROGRESS NOTE ADULT - SUBJECTIVE AND OBJECTIVE BOX
HPI:  84 year old male PMH HTN, Multiple Myeloma, and prostate ca p/w sepsis, likely urosepsis. Patient developed cough and fever last night and this morning woke up confused and altered mental status. EMS called this morning, they were unable to get iv and found him hypotensive. Given IVF in ER.   Lactate 2.1 on arrival. UA appeared infected.  Sepsis present on arrival. (10 Jul 2020 09:22)      24 hr events:      ## ROS:  [ ] unable to obtain  CONSTITUTIONAL: No fever, weight loss, or fatigue  EYES: No eye pain, visual disturbances, or discharge  ENMT:  No difficulty hearing, tinnitus, vertigo; No sinus or throat pain  NECK: No pain or stiffness  RESPIRATORY: No cough, wheezing, chills or hemoptysis; No shortness of breath  CARDIOVASCULAR: No chest pain, palpitations, dizziness, or leg swelling  GASTROINTESTINAL: No abdominal or epigastric pain. No nausea, vomiting, or hematemesis; No diarrhea or constipation. No melena or hematochezia.  GENITOURINARY: No dysuria, frequency, hematuria, or incontinence  NEUROLOGICAL: No headaches, memory loss, loss of strength, numbness, or tremors  SKIN: No itching, burning, rashes, or lesions   LYMPH NODES: No enlarged glands  ENDOCRINE: No heat or cold intolerance; No hair loss  MUSCULOSKELETAL: No joint pain or swelling; No muscle, back, or extremity pain  PSYCHIATRIC: No depression, anxiety, mood swings, or difficulty sleeping  HEME/LYMPH: No easy bruising, or bleeding gums  ALLERGY AND IMMUNOLOGIC: No hives or eczema    ## Labs:  CBC:                        9.2    7.86  )-----------( 51       ( 11 Jul 2020 04:33 )             29.2     Chem:  07-11    139  |  109<H>  |  16  ----------------------------<  124<H>  3.8   |  23  |  0.84    Ca    7.2<L>      11 Jul 2020 04:33  Phos  3.0     07-11  Mg     2.4     07-11    TPro  7.8  /  Alb  1.4<L>  /  TBili  0.5  /  DBili  x   /  AST  15  /  ALT  8<L>  /  AlkPhos  51  07-10    Coags:  PT/INR - ( 10 Jul 2020 07:43 )   PT: 20.1 sec;   INR: 1.87 ratio         PTT - ( 10 Jul 2020 07:43 )  PTT:36.0 sec        ## Imaging:    ## Medications:  acyclovir   Oral Tab/Cap 400 milliGRAM(s) Oral two times a day  cefTRIAXone   IVPB 1000 milliGRAM(s) IV Intermittent every 24 hours    midodrine 20 milliGRAM(s) Oral every 8 hours      hydrocortisone sodium succinate Injectable 50 milliGRAM(s) IV Push every 8 hours    enoxaparin Injectable 40 milliGRAM(s) SubCutaneous daily    pantoprazole  Injectable 40 milliGRAM(s) IV Push daily    levETIRAcetam 750 milliGRAM(s) Oral two times a day      ## Vitals:  T(C): 36.4 (07-11-20 @ 19:27), Max: 36.8 (07-11-20 @ 15:11)  HR: 77 (07-11-20 @ 21:00) (70 - 105)  BP: 100/63 (07-11-20 @ 21:00) (76/48 - 106/73)  BP(mean): 71 (07-11-20 @ 21:00) (54 - 82)  RR: 14 (07-11-20 @ 21:00) (11 - 27)  SpO2: 97% (07-11-20 @ 21:00) (93% - 100%)  Wt(kg): --  Vent:   ABG: ABG - ( 10 Jul 2020 08:20 )  pH, Arterial: x     pH, Blood: 7.49  /  pCO2: 31    /  pO2: 82    / HCO3: 24    / Base Excess: 0.7   /  SaO2: 96                    07-10 @ 07:01  -  07-11 @ 07:00  --------------------------------------------------------  IN: 3414.1 mL / OUT: 930 mL / NET: 2484.1 mL    07-11 @ 07:01  -  07-11 @ 22:19  --------------------------------------------------------  IN: 300 mL / OUT: 0 mL / NET: 300 mL          ## P/E:  Gen: lying comfortably in bed in no apparent distress  HEENT: PERRL, EOMI  Resp: CTA B/L no c/r/w  CVS: S1S2 no m/r/g  Abd: soft NT/ND +BS  Ext: no c/c/e  Neuro: A&Ox3    CENTRAL LINE: [ ] YES [ ] NO  LOCATION:   DATE INSERTED:  REMOVE: [ ] YES [ ] NO      LANE: [ ] YES [ ] NO    DATE INSERTED:  REMOVE:  [ ] YES [ ] NO      A-LINE:  [ ] YES [ ] NO  LOCATION:   DATE INSERTED:  REMOVE:  [ ] YES [ ] NO  EXPLAIN:    GLOBAL ISSUE/BEST PRACTICE:  Analgesia:  Sedation:  HOB elevation: yes  Stress ulcer prophylaxis:  VTE prophylaxis:  Oral Care:  Glycemic control:  Nutrition:    CODE STATUS: [ ] full code  [ ] DNR  [ ] DNI  [ ] MOLST  Goals of care discussion: [ ] yes HPI:  84M PMH HTN, Multiple Myeloma, and prostate ca  s/p RT, radiation cystitis, radiation proctitis, urinary incontinence, upper GI bleed, hx of UTI presents for fever, AMS. Pt reports urinary frequency without complaints of dysuria. Per chart pt with cough however upon interview pt denies cough. UA + Lactate 2.3, Hb 7.0. Admitted to medical service for sepsis, UTI. Pt became progressively more hypotensive SBP 70-80s despite 4L IVF bolus. Transferred to ICU for sepsis, septic shock secondary to urinary source of infection (UTI)      24 hr events:  weaned off levophed this morning  midodrine dose increased to 20mg  no acute events overnight      ## ROS:  [x] limited due to lethargy  no fever  no SOB  no CP  no abdominal pain      ## Labs:  CBC:                        9.2    7.86  )-----------( 51       ( 11 Jul 2020 04:33 )             29.2       Chem:  07-11    139  |  109<H>  |  16  ----------------------------<  124<H>  3.8   |  23  |  0.84    Ca    7.2<L>      11 Jul 2020 04:33  Phos  3.0     07-11  Mg     2.4     07-11    TPro  7.8  /  Alb  1.4<L>  /  TBili  0.5  /  AST  15  /  ALT  8<L>  /  AlkPhos  51  07-10    Coags:  PT/INR - ( 10 Jul 2020 07:43 )   PT: 20.1 sec;   INR: 1.87 ratio    PTT - ( 10 Jul 2020 07:43 )  PTT:36.0 sec    Culture - Urine (07.10.20 @ 11:17)    Specimen Source: .Urine Clean Catch (Midstream)    Culture Results: gram negative rods        ## Imaging:  CXR < from: Xray Chest 1 View-PORTABLE IMMEDIATE (07.10.20 @ 08:48) >  No lung consolidations.      ## Medications:  acyclovir   Oral Tab/Cap 400 milliGRAM(s) Oral two times a day  cefTRIAXone   IVPB 1000 milliGRAM(s) IV Intermittent every 24 hours    midodrine 20 milliGRAM(s) Oral every 8 hours      hydrocortisone sodium succinate Injectable 50 milliGRAM(s) IV Push every 8 hours    enoxaparin Injectable 40 milliGRAM(s) SubCutaneous daily    pantoprazole  Injectable 40 milliGRAM(s) IV Push daily    levETIRAcetam 750 milliGRAM(s) Oral two times a day      ## Vitals:  T(C): 36.4 (07-11-20 @ 19:27), Max: 36.8 (07-11-20 @ 15:11)  HR: 77 (07-11-20 @ 21:00) (70 - 105)  BP: 100/63 (07-11-20 @ 21:00) (76/48 - 106/73)  BP(mean): 71 (07-11-20 @ 21:00) (54 - 82)  RR: 14 (07-11-20 @ 21:00) (11 - 27)  SpO2: 97% (07-11-20 @ 21:00) (93% - 100%)    ABG: ABG - ( 10 Jul 2020 08:20 )  pH, Arterial:   pH, Blood: 7.49  /  pCO2: 31    /  pO2: 82    / HCO3: 24    / Base Excess: 0.7   /  SaO2: 96            07-10 @ 07:01  -  07-11 @ 07:00  --------------------------------------------------------  IN: 3414.1 mL / OUT: 930 mL / NET: 2484.1 mL    07-11 @ 07:01 - 07-11 @ 22:19  --------------------------------------------------------  IN: 300 mL / OUT: 0 mL / NET: 300 mL        ## P/E:  Gen: lying comfortably in bed in no apparent distress  HEENT: EOMI  Resp: CTA B/L   CVS: RRR  Abd: soft NT/ND +BS  Ext: no c/c/e  Neuro: a bit lethargic but arousable, answering simple questions      CENTRAL LINE: [ ] YES [x] NO  LOCATION:   DATE INSERTED:  REMOVE: [ ] YES [ ] NO      LANE: [ ] YES [x] NO    DATE INSERTED:  REMOVE:  [ ] YES [ ] NO      A-LINE:  [ ] YES [x] NO  LOCATION:   DATE INSERTED:  REMOVE:  [ ] YES [ ] NO  EXPLAIN:    GLOBAL ISSUE/BEST PRACTICE:  Analgesia: n/a  Sedation: n/a  HOB elevation: yes  Stress ulcer prophylaxis: protonix  VTE prophylaxis: lovenox  Oral Care: n/a  Glycemic control: n/a  Nutrition: po diet    CODE STATUS: [x] full code  [ ] DNR  [ ] DNI  [ ] MOLST  Goals of care discussion: [ ] yes

## 2020-07-12 NOTE — PROGRESS NOTE ADULT - SUBJECTIVE AND OBJECTIVE BOX
HPI:  84M PMH HTN, Multiple Myeloma, and prostate ca  s/p RT, radiation cystitis, radiation proctitis, urinary incontinence, upper GI bleed, hx of UTI presents for fever, AMS. Pt reports urinary frequency without complaints of dysuria. Per chart pt with cough however upon interview pt denies cough. UA + Lactate 2.3, Hb 7.0. Admitted to medical service for sepsis, UTI. Pt became progressively more hypotensive SBP 70-80s despite 4L IVF bolus. Transferred to ICU for sepsis, septic shock secondary to urinary source of infection (UTI)      24 hr events:  remains off levophed x 24 hours  no acute events overnight  hypothermic on warming blanket      ## ROS:  denies chills, but "feels cold"  no HA, no dizziness  no sore throat  no CP, no palpitations  no SOB, no cough  no abdominal pain, no nausea, no emesis  denies dysuria or frequency      ## Labs:  CBC:                        8.6    8.26  )-----------( 52       ( 12 Jul 2020 09:50 )             26.8     Chem:  07-12    141  |  111<H>  |  19  ----------------------------<  186<H>  3.6   |  21<L>  |  1.05    Ca    7.1<L>      12 Jul 2020 03:40  Phos  2.5     07-12  Mg     2.3     07-12    Culture - Urine (07.10.20 @ 11:17)    Specimen Source: .Urine Clean Catch (Midstream)    Culture Results: >100,000 CFU/ml Pseudomonas aeruginosa      Culture - Blood (07.10.20 @ 09:01)    Specimen Source: .Blood Blood-Peripheral    Culture Results: No growth to date.    Culture - Blood (07.10.20 @ 09:01)    Specimen Source: .Blood Blood-Peripheral    Culture Results: No growth to date.        ## Medications:  acyclovir   Oral Tab/Cap 400 milliGRAM(s) Oral two times a day    piperacillin/tazobactam IVPB.. 3.375 Gram(s) IV Intermittent every 8 hours    midodrine 20 milliGRAM(s) Oral every 8 hours    hydrocortisone sodium succinate Injectable 50 milliGRAM(s) IV Push every 8 hours    enoxaparin Injectable 40 milliGRAM(s) SubCutaneous daily    pantoprazole  Injectable 40 milliGRAM(s) IV Push daily    levETIRAcetam 750 milliGRAM(s) Oral two times a day      ## Vitals:  T(C): 36.2 (07-12-20 @ 15:20), Max: 36.6 (07-11-20 @ 23:18)  HR: 81 (07-12-20 @ 17:00) (56 - 98)  BP: 110/69 (07-12-20 @ 17:00) (76/48 - 120/85)  BP(mean): 79 (07-12-20 @ 17:00) (54 - 93)  RR: 25 (07-12-20 @ 17:00) (0 - 25)  SpO2: 99% (07-12-20 @ 17:00) (88% - 100%)        07-11 @ 07:01  -  07-12 @ 07:00  --------------------------------------------------------  IN: 400 mL / OUT: 300 mL / NET: 100 mL          ## P/E:  Gen: elderly male, mildly lethargic but arousable, lying comfortably in bed in no apparent distress  HEENT: EOMI  Resp: CTA B/L   CVS: RRR  Abd: soft NT/ND +BS  Ext: no c/c/e  Neuro: mildly lethargic but arousable, follows commands, answering questions    CENTRAL LINE: [ ] YES [x] NO  LOCATION:   DATE INSERTED:  REMOVE: [ ] YES [ ] NO      LANE: [ ] YES [ ] NO    DATE INSERTED:  REMOVE:  [ ] YES [ ] NO      A-LINE:  [ ] YES [x] NO  LOCATION:   DATE INSERTED:  REMOVE:  [ ] YES [ ] NO  EXPLAIN:    GLOBAL ISSUE/BEST PRACTICE:  Analgesia: n/a  Sedation: n/a  HOB elevation: yes  Stress ulcer prophylaxis: protonix  VTE prophylaxis: bilateral compression devices  Oral Care: n/a  Glycemic control: n/a  Nutrition: po diet    CODE STATUS: [x] full code  [ ] DNR  [ ] DNI  [ ] MOLST  Goals of care discussion: [ ] yes HPI:  84M PMH HTN, Multiple Myeloma, and prostate ca  s/p RT, radiation cystitis, radiation proctitis, urinary incontinence, upper GI bleed, hx of UTI presents for fever, AMS. Pt reports urinary frequency without complaints of dysuria. Per chart pt with cough however upon interview pt denies cough. UA + Lactate 2.3, Hb 7.0. Admitted to medical service for sepsis, UTI. Pt became progressively more hypotensive SBP 70-80s despite 4L IVF bolus. Transferred to ICU for sepsis, septic shock secondary to urinary source of infection (UTI)      24 hr events:  remains off levophed x 24 hours  no acute events overnight  hypothermic on warming blanket      ## ROS:  denies chills, but "feels cold"  no HA, no dizziness  no sore throat  no CP, no palpitations  no SOB, no cough  no abdominal pain, no nausea, no emesis  denies dysuria or frequency      ## Labs:  CBC:                        8.6    8.26  )-----------( 52       ( 12 Jul 2020 09:50 )             26.8     Chem:  07-12    141  |  111<H>  |  19  ----------------------------<  186<H>  3.6   |  21<L>  |  1.05    Ca    7.1<L>      12 Jul 2020 03:40  Phos  2.5     07-12  Mg     2.3     07-12    Culture - Urine (07.10.20 @ 11:17)    Specimen Source: .Urine Clean Catch (Midstream)    Culture Results: >100,000 CFU/ml Pseudomonas aeruginosa      Culture - Blood (07.10.20 @ 09:01)    Specimen Source: .Blood Blood-Peripheral    Culture Results: No growth to date.    Culture - Blood (07.10.20 @ 09:01)    Specimen Source: .Blood Blood-Peripheral    Culture Results: No growth to date.        ## Medications:  acyclovir   Oral Tab/Cap 400 milliGRAM(s) Oral two times a day    piperacillin/tazobactam IVPB.. 3.375 Gram(s) IV Intermittent every 8 hours    midodrine 20 milliGRAM(s) Oral every 8 hours    hydrocortisone sodium succinate Injectable 50 milliGRAM(s) IV Push every 8 hours    enoxaparin Injectable 40 milliGRAM(s) SubCutaneous daily    pantoprazole  Injectable 40 milliGRAM(s) IV Push daily    levETIRAcetam 750 milliGRAM(s) Oral two times a day      ## Vitals:  T(C): 36.2 (07-12-20 @ 15:20), Max: 36.6 (07-11-20 @ 23:18)  HR: 81 (07-12-20 @ 17:00) (56 - 98)  BP: 110/69 (07-12-20 @ 17:00) (76/48 - 120/85)  BP(mean): 79 (07-12-20 @ 17:00) (54 - 93)  RR: 25 (07-12-20 @ 17:00) (0 - 25)  SpO2: 99% (07-12-20 @ 17:00) (88% - 100%)        07-11 @ 07:01  -  07-12 @ 07:00  --------------------------------------------------------  IN: 400 mL / OUT: 300 mL / NET: 100 mL          ## P/E:  Gen: elderly male, mildly lethargic but arousable, lying comfortably in bed in no apparent distress  HEENT: EOMI  Resp: CTA B/L   CVS: RRR  Abd: soft NT/ND +BS  Ext: no c/c/e  Neuro: mildly lethargic but arousable, follows commands, answering questions    CENTRAL LINE: [ ] YES [x] NO  LOCATION:   DATE INSERTED:  REMOVE: [ ] YES [ ] NO      LANE: [ ] YES [ ] NO    DATE INSERTED:  REMOVE:  [ ] YES [ ] NO      A-LINE:  [ ] YES [x] NO  LOCATION:   DATE INSERTED:  REMOVE:  [ ] YES [ ] NO  EXPLAIN:    GLOBAL ISSUE/BEST PRACTICE:  Analgesia: n/a  Sedation: n/a  HOB elevation: yes  Stress ulcer prophylaxis: protonix  VTE prophylaxis: lovenox  Oral Care: n/a  Glycemic control: n/a  Nutrition: po diet    CODE STATUS: [x] full code  [ ] DNR  [ ] DNI  [ ] MOLST  Goals of care discussion: [ ] yes

## 2020-07-12 NOTE — CHART NOTE - NSCHARTNOTEFT_GEN_A_CORE
85yo M with PMH of HTN, multiple myeloma, CA prostate and adrenal insufficiency presented to ED on 7/10/20 with fever, AMS, and hypotension. Admitted to ICU for septic shock requiring vasopressor support. Found to have urosepsis, urine cx growing pseudomonas. Initial empiric CTX switched to Ciprofloxacin based on sensitivity. Pt off pressors since yesterday evening; has remained hemodynamically stable. Pt currently stable for downgrade.    Case seen and discussed with ICU attending Dr. Jenkins.  Verbal handoff given to hospitalist Dr. Torres. Pt stable for transfer to medical floor.

## 2020-07-12 NOTE — PROGRESS NOTE ADULT - SUBJECTIVE AND OBJECTIVE BOX
lying in bed, BP better	    Vital Signs Last 24 Hrs  T(C): 35.2 (12 Jul 2020 07:12), Max: 36.8 (11 Jul 2020 15:11)  T(F): 95.4 (12 Jul 2020 07:12), Max: 98.2 (11 Jul 2020 15:11)  HR: 64 (12 Jul 2020 09:00) (56 - 105)  BP: 115/77 (12 Jul 2020 09:00) (76/48 - 116/84)  BP(mean): 86 (12 Jul 2020 09:00) (54 - 92)  RR: 18 (12 Jul 2020 09:00) (0 - 27)  SpO2: 100% (12 Jul 2020 09:00) (88% - 100%)    PHYSICAL EXAM:    general - AAO   HEENT - No Icterus  CVS - RRR  RS - AE B/L  Abd - soft, NT  Ext - Pulses +        LABS:                        8.6    8.26  )-----------( 52       ( 12 Jul 2020 09:50 )             26.8     07-12    141  |  111<H>  |  19  ----------------------------<  186<H>  3.6   |  21<L>  |  1.05    Ca    7.1<L>      12 Jul 2020 03:40  Phos  2.5     07-12  Mg     2.3     07-12            Culture - Urine (collected 10 Jul 2020 11:17)  Source: .Urine Clean Catch (Midstream)  Preliminary Report (11 Jul 2020 15:30):    >100,000 CFU/ml Pseudomonas aeruginosa    Culture - Blood (collected 10 Jul 2020 09:01)  Source: .Blood Blood-Peripheral  Preliminary Report (11 Jul 2020 10:00):    No growth to date.    Culture - Blood (collected 10 Jul 2020 09:01)  Source: .Blood Blood-Peripheral  Preliminary Report (11 Jul 2020 10:00):    No growth to date.        RADIOLOGY & ADDITIONAL STUDIES:

## 2020-07-13 NOTE — PROGRESS NOTE ADULT - SUBJECTIVE AND OBJECTIVE BOX
Patient is a 84y old  Male who presents with a chief complaint of Sepsis with hypotension. (2020 10:12)    HPI:  84 year old male PMH HTN, Multiple Myeloma, and prostate ca p/w sepsis, likely urosepsis. Patient developed cough and fever last night and this morning woke up confused and altered mental status. EMS called this morning, they were unable to get iv and found him hypotensive. Given IVF in ER.   Lactate 2.1 on arrival. UA appeared infected.  Sepsis present on arrival. (10 Jul 2020 09:22)    SUBJECTIVE & OBJECTIVE: Pt seen and examined at bedside. Complaining of constipation.  PHYSICAL EXAM:  ICU Vital Signs Last 24 Hrs  T(C): 36.4 (2020 11:04), Max: 37 (2020 19:43)  T(F): 97.6 (2020 11:04), Max: 98.6 (2020 19:43)  HR: 89 (2020 11:04) (63 - 103)  BP: 115/88 (2020 11:04) (88/55 - 123/89)  BP(mean): 80 (2020 19:00) (63 - 80)  RR: 17 (2020 11:04) (16 - 25)  SpO2: 99% (2020 11:04) (98% - 100%)  Daily     Daily Weight in k (2020 05:52)  I&O's Detail    2020 07:  -  2020 07:00  --------------------------------------------------------  IN:    Solution: 400 mL  Total IN: 400 mL    OUT:    Voided: 800 mL  Total OUT: 800 mL    Total NET: -400 mL      2020 07:01  -  2020 15:00  --------------------------------------------------------  IN:    Oral Fluid: 340 mL  Total IN: 340 mL    OUT:  Total OUT: 0 mL    Total NET: 340 mL        GENERAL: NAD, well-groomed, well-developed  HEAD:  Atraumatic, Normocephalic  EYES: EOMI, PERRLA, conjunctiva and sclera clear  ENMT: Moist mucous membranes  NECK: Supple, No JVD  NERVOUS SYSTEM:  Alert & Oriented X3, Motor Strength 5/5 B/L upper and lower extremities; DTRs 2+ intact and symmetric  CHEST/LUNG: Clear to auscultation bilaterally; No rales, rhonchi, wheezing, or rubs  HEART: Regular rate and rhythm; No murmurs, rubs, or gallops  ABDOMEN: Soft, Nontender, Nondistended; Bowel sounds present  EXTREMITIES:  2+ Peripheral Pulses, No clubbing, cyanosis, or edema  MEDICATIONS  (STANDING):  acyclovir   Oral Tab/Cap 400 milliGRAM(s) Oral two times a day  brimonidine 0.2% Ophthalmic Solution 1 Drop(s) Left EYE two times a day  chlorhexidine 4% Liquid 1 Application(s) Topical <User Schedule>  ciprofloxacin   IVPB      ciprofloxacin   IVPB 400 milliGRAM(s) IV Intermittent every 12 hours  dorzolamide 2% Ophthalmic Solution 1 Drop(s) Left EYE <User Schedule>  enoxaparin Injectable 40 milliGRAM(s) SubCutaneous daily  hydrocortisone sodium succinate Injectable 50 milliGRAM(s) IV Push every 8 hours  latanoprost 0.005% Ophthalmic Solution 1 Drop(s) Both EYES at bedtime  levETIRAcetam 750 milliGRAM(s) Oral two times a day  midodrine 20 milliGRAM(s) Oral every 8 hours  pantoprazole  Injectable 40 milliGRAM(s) IV Push daily  senna 2 Tablet(s) Oral at bedtime    MEDICATIONS  (PRN):  acetaminophen   Tablet .. 650 milliGRAM(s) Oral every 6 hours PRN Mild Pain (1 - 3)    LABS:                        8.6    8.26  )-----------( 52       ( 2020 09:50 )             26.8     07-12    141  |  111<H>  |  19  ----------------------------<  186<H>  3.6   |  21<L>  |  1.05    Ca    7.1<L>      2020 03:40  Phos  2.5     07-12  Mg     2.3     -12    RECENT CULTURES: Culture - Urine (07.10.20 @ 11:17)    -  Gentamicin: S 4    -  Imipenem: S <=1    -  Levofloxacin: S <=0.5    -  Meropenem: S <=1    -  Piperacillin/Tazobactam: S <=8    -  Tobramycin: S <=2    -  Amikacin: S <=16    -  Aztreonam: S <=4    -  Cefepime: S <=2    -  Ceftazidime: S 4    -  Ciprofloxacin: S <=0.25    Specimen Source: .Urine Clean Catch (Midstream)    Culture Results:   >100,000 CFU/ml Pseudomonas aeruginosa    Organism Identification: Pseudomonas aeruginosa    Organism: Pseudomonas aeruginosa    Method Type: KIRILL    RADIOLOGY & ADDITIONAL TESTS:    DVT/GI ppx  Discussed with pt @ bedside

## 2020-07-13 NOTE — PROGRESS NOTE ADULT - SUBJECTIVE AND OBJECTIVE BOX
lying in bed    Vital Signs Last 24 Hrs  T(C): 36.7 (13 Jul 2020 05:52), Max: 37 (12 Jul 2020 19:43)  T(F): 98 (13 Jul 2020 05:52), Max: 98.6 (12 Jul 2020 19:43)  HR: 103 (13 Jul 2020 05:52) (59 - 103)  BP: 123/89 (13 Jul 2020 05:52) (88/55 - 123/89)  BP(mean): 80 (12 Jul 2020 19:00) (63 - 93)  RR: 18 (13 Jul 2020 05:52) (15 - 25)  SpO2: 98% (13 Jul 2020 05:52) (95% - 100%)    PHYSICAL EXAM:    general - AAO x 3  HEENT - No Icterus  CVS - RRR  RS - AE B/L  Abd - soft, NT, distended   Ext - Pulses +        LABS:                        8.6    8.26  )-----------( 52       ( 12 Jul 2020 09:50 )             26.8     07-12    141  |  111<H>  |  19  ----------------------------<  186<H>  3.6   |  21<L>  |  1.05    Ca    7.1<L>      12 Jul 2020 03:40  Phos  2.5     07-12  Mg     2.3     07-12            Culture - Urine (collected 10 Jul 2020 11:17)  Source: .Urine Clean Catch (Midstream)  Final Report (12 Jul 2020 13:38):    >100,000 CFU/ml Pseudomonas aeruginosa  Organism: Pseudomonas aeruginosa (12 Jul 2020 13:38)  Organism: Pseudomonas aeruginosa (12 Jul 2020 13:38)    Culture - Blood (collected 10 Jul 2020 09:01)  Source: .Blood Blood-Peripheral  Preliminary Report (11 Jul 2020 10:00):    No growth to date.    Culture - Blood (collected 10 Jul 2020 09:01)  Source: .Blood Blood-Peripheral  Preliminary Report (11 Jul 2020 10:00):    No growth to date.        RADIOLOGY & ADDITIONAL STUDIES:

## 2020-07-14 NOTE — PROGRESS NOTE ADULT - SUBJECTIVE AND OBJECTIVE BOX
Patient is a 84y old  Male who presents with a chief complaint of Sepsis with hypotension. (2020 10:08)    HPI:  84 year old male PMH HTN, Multiple Myeloma, and prostate ca p/w sepsis, likely urosepsis. Patient developed cough and fever last night and this morning woke up confused and altered mental status. EMS called this morning, they were unable to get iv and found him hypotensive. Given IVF in ER.   Lactate 2.1 on arrival. UA appeared infected.  Sepsis present on arrival. (10 Jul 2020 09:22)    SUBJECTIVE & OBJECTIVE: Pt seen and examined at bedside. No complaints this am.   PHYSICAL EXAM:  vital Signs Last 24 Hrs  T(C): 36.8 (2020 15:01), Max: 36.9 (2020 23:44)  T(F): 98.2 (2020 15:01), Max: 98.4 (2020 23:44)  HR: 104 (2020 15:) (73 - 104)  BP: 115/85 (2020 15:01) (115/85 - 141/81)  RR: 17 (2020 15:01) (17 - 18)  SpO2: 99% (2020 15:01) (98% - 99%)  Daily     Daily Weight in k.5 (2020 04:23)  I&O's Detail    2020 07:01  -  2020 07:00  --------------------------------------------------------  IN:    Oral Fluid: 340 mL    Solution: 200 mL  Total IN: 540 mL    OUT:  Total OUT: 0 mL    Total NET: 540 mL        GENERAL: NAD, well-groomed, well-developed  HEAD:  Atraumatic, Normocephalic  EYES: EOMI, PERRLA, conjunctiva and sclera clear  ENMT: Moist mucous membranes  NECK: Supple, No JVD  NERVOUS SYSTEM:  Alert & Oriented X3, Motor Strength 5/5 B/L upper and lower extremities; DTRs 2+ intact and symmetric  CHEST/LUNG: Clear to auscultation bilaterally; No rales, rhonchi, wheezing, or rubs  HEART: Regular rate and rhythm; No murmurs, rubs, or gallops  ABDOMEN: Soft, Nontender, Nondistended; Bowel sounds present  EXTREMITIES:  2+ Peripheral Pulses, No clubbing, cyanosis, or edema  MEDICATIONS  (STANDING):  acyclovir   Oral Tab/Cap 400 milliGRAM(s) Oral two times a day  brimonidine 0.2% Ophthalmic Solution 1 Drop(s) Left EYE two times a day  chlorhexidine 4% Liquid 1 Application(s) Topical <User Schedule>  ciprofloxacin   IVPB      ciprofloxacin   IVPB 400 milliGRAM(s) IV Intermittent every 12 hours  dorzolamide 2% Ophthalmic Solution 1 Drop(s) Left EYE <User Schedule>  enoxaparin Injectable 40 milliGRAM(s) SubCutaneous daily  hydrocortisone sodium succinate Injectable 50 milliGRAM(s) IV Push every 8 hours  latanoprost 0.005% Ophthalmic Solution 1 Drop(s) Both EYES at bedtime  levETIRAcetam 750 milliGRAM(s) Oral two times a day  midodrine 20 milliGRAM(s) Oral every 8 hours  pantoprazole  Injectable 40 milliGRAM(s) IV Push daily  senna 2 Tablet(s) Oral at bedtime    MEDICATIONS  (PRN):  acetaminophen   Tablet .. 650 milliGRAM(s) Oral every 6 hours PRN Mild Pain (1 - 3)    LABS:                        8.3    4.21  )-----------( 43       ( 2020 11:15 )             26.0     07-14    141  |  112<H>  |  16  ----------------------------<  140<H>  4.3   |  20<L>  |  0.93    Ca    7.8<L>      2020 11:15          CAPILLARY BLOOD GLUCOSE                RECENT CULTURES:    RADIOLOGY & ADDITIONAL TESTS:    DVT/GI ppx  Discussed with pt @ bedside

## 2020-07-14 NOTE — PROGRESS NOTE ADULT - SUBJECTIVE AND OBJECTIVE BOX
lying in bed    Vital Signs Last 24 Hrs  T(C): 36.6 (14 Jul 2020 04:23), Max: 36.9 (13 Jul 2020 23:44)  T(F): 97.8 (14 Jul 2020 04:23), Max: 98.4 (13 Jul 2020 23:44)  HR: 82 (14 Jul 2020 04:23) (65 - 89)  BP: 121/92 (14 Jul 2020 04:23) (109/81 - 141/81)  BP(mean): --  RR: 18 (14 Jul 2020 04:23) (16 - 18)  SpO2: 98% (14 Jul 2020 04:23) (98% - 99%)    PHYSICAL EXAM:    general - weak looking +  HEENT - No Icterus  CVS - RRR  RS - AE B/L  Abd - soft, NT  Ext - Pulses +        LABS:                Culture - Urine (collected 10 Jul 2020 11:17)  Source: .Urine Clean Catch (Midstream)  Final Report (12 Jul 2020 13:38):    >100,000 CFU/ml Pseudomonas aeruginosa  Organism: Pseudomonas aeruginosa (12 Jul 2020 13:38)  Organism: Pseudomonas aeruginosa (12 Jul 2020 13:38)    Culture - Blood (collected 10 Jul 2020 09:01)  Source: .Blood Blood-Peripheral  Preliminary Report (11 Jul 2020 10:00):    No growth to date.    Culture - Blood (collected 10 Jul 2020 09:01)  Source: .Blood Blood-Peripheral  Preliminary Report (11 Jul 2020 10:00):    No growth to date.        RADIOLOGY & ADDITIONAL STUDIES:

## 2020-07-14 NOTE — CHART NOTE - NSCHARTNOTEFT_GEN_A_CORE
Pt admitted c AMS; found to have sepsis 2/2 UTI, hypotension. PMHx includes Multiple Myeloma, Prostate Ca, Iron deficiency anemia    Factors impacting intake: [ ] none [ ] nausea  [ ] vomiting [ ] diarrhea [ ] constipation  [ ]chewing problems [ ] swallowing issues  [x ] other: inability to self-feed    Diet Prescription: Diet, Mechanical Soft (07-11-20 @ 13:40)    Intake:   pt consuming 50-75% most meals c total assistance; for lunch today pt reported he was hungry but needed staff to come to assist him; staff aware    Current Weight: Weight (kg): 70.5 kg (7/14); 72.3 kg (7/12); recorded admission wt of 50 kg (7/10) appears to be inaccurate  % Weight Change: 2.5% x 2 days    Nutrition focused physical exam conducted:    Subcutaneous fat loss: [moderate ] Orbital fat pads region, [WDL ]Buccal fat region, [unable ]Triceps region,  [moderate ]Ribs region.    Muscle wasting: [moderate ]Temples region, [severe ]Clavicle region, [severe ]Shoulder region, [unable ]Scapula region, [unable ]Interosseous region,  [WDL ]thigh region, [WDL ]Calf region    Physical Appearance:  1+ generalized noted + 2+ b/l hands and legs & 3+ b/l flanks    Pertinent Medications: MEDICATIONS  (STANDING):  acyclovir   Oral Tab/Cap 400 milliGRAM(s) Oral two times a day  brimonidine 0.2% Ophthalmic Solution 1 Drop(s) Left EYE two times a day  chlorhexidine 4% Liquid 1 Application(s) Topical <User Schedule>  ciprofloxacin   IVPB      ciprofloxacin   IVPB 400 milliGRAM(s) IV Intermittent every 12 hours  dorzolamide 2% Ophthalmic Solution 1 Drop(s) Left EYE <User Schedule>  enoxaparin Injectable 40 milliGRAM(s) SubCutaneous daily  hydrocortisone sodium succinate Injectable 50 milliGRAM(s) IV Push every 8 hours  latanoprost 0.005% Ophthalmic Solution 1 Drop(s) Both EYES at bedtime  levETIRAcetam 750 milliGRAM(s) Oral two times a day  midodrine 20 milliGRAM(s) Oral every 8 hours  pantoprazole  Injectable 40 milliGRAM(s) IV Push daily  senna 2 Tablet(s) Oral at bedtime    MEDICATIONS  (PRN):  acetaminophen   Tablet .. 650 milliGRAM(s) Oral every 6 hours PRN Mild Pain (1 - 3)    Pertinent Labs:  07-14 Na141 mmol/L Glu 140 mg/dL<H> K+ 4.3 mmol/L Cr  0.93 mg/dL BUN 16 mg/dL 07-12 Phos 2.5 mg/dL 07-10 Alb 1.4 g/dL<L>    Skin:   WDL    Estimated Needs:   [X ] no change since previous assessment  (7/10)  [ ] recalculated:     Previous Nutrition Diagnosis:   [X ] Severe malnutrition in context of chronic illness    Etiology:  Inadequate energy/protein intake related to multiple myeloma, prostate Ca s/p chemo & radiation tx, iron deficiency anemia    Previous Signs & Symptoms:  53% wt loss x 1 year; <75% nutrition needs > 1 month  NEW Signs & Symptoms:  53% wt loss x 1 year; physical finding of severe muscle wasting as noted    Previous GOAL:  Pt to meet >75% kcal/protein needs via diet advancement  NEW GOAL:  Pt to meet >75% energy/protein needs via meals/supplements    Nutrition Diagnosis is [ X] ongoing  [ ] resolved [ ] not applicable     New Nutrition Diagnosis: [X ] not applicable    Interventions:   continue mechanical soft diet  Recommend  [ ] Change Diet To:  [X ] Nutrition Supplement:  Ensure Enlive x 2/day (provides 700 kcal, 40 g protein)   [ ] Nutrition Support  [ ] Other:     Monitoring and Evaluation:   [X ] PO intake [ x ] Tolerance to diet prescription [ x ] weights [ x ] labs[ x ] follow up per protocol  [ ] other:

## 2020-07-15 NOTE — PROGRESS NOTE ADULT - SUBJECTIVE AND OBJECTIVE BOX
Patient is a 84y old  Male who presents with a chief complaint of Sepsis with hypotension. (15 Jul 2020 11:28)    HPI:  84 year old male PMH HTN, Multiple Myeloma, and prostate ca p/w sepsis, likely urosepsis. Patient developed cough and fever last night and this morning woke up confused and altered mental status. EMS called this morning, they were unable to get iv and found him hypotensive. Given IVF in ER.   Lactate 2.1 on arrival. UA appeared infected.  Sepsis present on arrival. (10 Jul 2020 09:22)    SUBJECTIVE & OBJECTIVE: Pt seen and examined at bedside. No interval change since yesterday.  Hgb is dropping   PHYSICAL EXAM:  Vital Signs Last 24 Hrs  T(C): 36.8 (15 Jul 2020 11:07), Max: 37.1 (2020 19:23)  T(F): 98.2 (15 Jul 2020 11:07), Max: 98.8 (2020 19:23)  HR: 72 (15 Jul 2020 11:07) (72 - 98)  BP: 120/81 (15 Jul 2020 11:07) (117/91 - 125/89)  RR: 17 (15 Jul 2020 11:07) (17 - 18)  SpO2: 95% (15 Jul 2020 11:07) (94% - 100%)  Daily     Daily Weight in k.5 (15 Jul 2020 05:00)  I&O's Detail    2020 07:01  -  15 Jul 2020 07:00  --------------------------------------------------------  IN:  Oral Fluid: 240 mL  Total IN: 240 mL    OUT:  Total OUT: 0 mL    Total NET: 240 mL      15 Jul 2020 07:01  -  15 Jul 2020 15:51  --------------------------------------------------------  IN:    Oral Fluid: 120 mL  Total IN: 120 mL    OUT:  Total OUT: 0 mL    Total NET: 120 mL        GENERAL: NAD, well-groomed, well-developed  HEAD:  Atraumatic, Normocephalic  EYES: EOMI, PERRLA, conjunctiva and sclera clear  ENMT: Moist mucous membranes  NECK: Supple, No JVD  NERVOUS SYSTEM:  Alert & Oriented X3, Motor Strength 5/5 B/L upper and lower extremities; DTRs 2+ intact and symmetric  CHEST/LUNG: Clear to auscultation bilaterally; No rales, rhonchi, wheezing, or rubs  HEART: Regular rate and rhythm; No murmurs, rubs, or gallops  ABDOMEN: Soft, Nontender, Nondistended; Bowel sounds present  EXTREMITIES:  2+ Peripheral Pulses, No clubbing, cyanosis, or edema  MEDICATIONS  (STANDING):  acyclovir   Oral Tab/Cap 400 milliGRAM(s) Oral two times a day  brimonidine 0.2% Ophthalmic Solution 1 Drop(s) Left EYE two times a day  chlorhexidine 4% Liquid 1 Application(s) Topical <User Schedule>  ciprofloxacin   IVPB      ciprofloxacin   IVPB 400 milliGRAM(s) IV Intermittent every 12 hours  dorzolamide 2% Ophthalmic Solution 1 Drop(s) Left EYE <User Schedule>  enoxaparin Injectable 40 milliGRAM(s) SubCutaneous daily  hydrocortisone sodium succinate Injectable 50 milliGRAM(s) IV Push every 8 hours  latanoprost 0.005% Ophthalmic Solution 1 Drop(s) Both EYES at bedtime  levETIRAcetam 750 milliGRAM(s) Oral two times a day  midodrine 20 milliGRAM(s) Oral every 8 hours  pantoprazole  Injectable 40 milliGRAM(s) IV Push daily  senna 2 Tablet(s) Oral at bedtime    MEDICATIONS  (PRN):  acetaminophen   Tablet .. 650 milliGRAM(s) Oral every 6 hours PRN Mild Pain (1 - 3)    LABS:                        7.2    3.04  )-----------( 57       ( 15 Jul 2020 06:37 )             23.7     07-15    144  |  113<H>  |  17  ----------------------------<  134<H>  4.1   |  22  |  0.84    Ca    7.8<L>      15 Jul 2020 06:37          CAPILLARY BLOOD GLUCOSE                RECENT CULTURES:    RADIOLOGY & ADDITIONAL TESTS:    DVT/GI ppx  Discussed with pt @ bedside

## 2020-07-15 NOTE — PROGRESS NOTE ADULT - SUBJECTIVE AND OBJECTIVE BOX
lying in care    Vital Signs Last 24 Hrs  T(C): 36.6 (15 Jul 2020 05:00), Max: 37.1 (14 Jul 2020 19:23)  T(F): 97.8 (15 Jul 2020 05:00), Max: 98.8 (14 Jul 2020 19:23)  HR: 84 (15 Jul 2020 05:00) (84 - 104)  BP: 121/89 (15 Jul 2020 05:00) (115/85 - 125/89)  BP(mean): --  RR: 18 (15 Jul 2020 05:00) (17 - 18)  SpO2: 96% (15 Jul 2020 05:00) (94% - 100%)    PHYSICAL EXAM:    general - AAO x 3  HEENT - No Icterus  CVS - RRR  RS - AE B/L  Abd - soft, NT  Ext - Pulses +        LABS:                        7.2    3.04  )-----------( 57       ( 15 Jul 2020 06:37 )             23.7     07-15    144  |  113<H>  |  17  ----------------------------<  134<H>  4.1   |  22  |  0.84    Ca    7.8<L>      15 Jul 2020 06:37            Culture - Urine (collected 10 Jul 2020 11:17)  Source: .Urine Clean Catch (Midstream)  Final Report (12 Jul 2020 13:38):    >100,000 CFU/ml Pseudomonas aeruginosa  Organism: Pseudomonas aeruginosa (12 Jul 2020 13:38)  Organism: Pseudomonas aeruginosa (12 Jul 2020 13:38)    Culture - Blood (collected 10 Jul 2020 09:01)  Source: .Blood Blood-Peripheral  Final Report (15 Jul 2020 10:01):    No Growth Final    Culture - Blood (collected 10 Jul 2020 09:01)  Source: .Blood Blood-Peripheral  Final Report (15 Jul 2020 10:01):    No Growth Final        RADIOLOGY & ADDITIONAL STUDIES:

## 2020-07-16 NOTE — PROVIDER CONTACT NOTE (CRITICAL VALUE NOTIFICATION) - BACKGROUND
pt w/ hx of multiple myeloma and prostate CA. Hemoglobin has been low throughout visit. PT given a unit of blood and procrit since admission

## 2020-07-16 NOTE — PROGRESS NOTE ADULT - SUBJECTIVE AND OBJECTIVE BOX
Patient is a 84y old  Male who presents with a chief complaint of Sepsis with hypotension. (2020 09:56)    HPI:  84 year old male PMH HTN, Multiple Myeloma, and prostate ca p/w sepsis, likely urosepsis. Patient developed cough and fever last night and this morning woke up confused and altered mental status. EMS called this morning, they were unable to get iv and found him hypotensive. Given IVF in ER.   Lactate 2.1 on arrival. UA appeared infected.  Sepsis present on arrival. (10 Jul 2020 09:22)    SUBJECTIVE & OBJECTIVE: Pt seen and examined at bedside. Doing well.   PHYSICAL EXAM:  Vital Signs Last 24 Hrs  T(C): 36.3 (2020 11:14), Max: 37.1 (15 Jul 2020 17:34)  T(F): 97.4 (2020 11:14), Max: 98.8 (15 Jul 2020 17:34)  HR: 59 (2020 11:14) (59 - 107)  BP: 118/89 (2020 11:14) (118/89 - 130/91)  RR: 16 (2020 11:14) (16 - 19)  SpO2: 100% (2020 11:14) (95% - 100%)  Daily     Daily Weight in k.2 (2020 05:09)  I&O's Detail    15 Jul 2020 07:01  -  2020 07:00  --------------------------------------------------------  IN:    Oral Fluid: 120 mL  Total IN: 120 mL    OUT:  Total OUT: 0 mL    Total NET: 120 mL        GENERAL: NAD, well-groomed, well-developed  HEAD:  Atraumatic, Normocephalic  EYES: EOMI, PERRLA, conjunctiva and sclera clear  ENMT: Moist mucous membranes  NECK: Supple, No JVD  NERVOUS SYSTEM:  Alert & Oriented X3, Motor Strength 5/5 B/L upper and lower extremities; DTRs 2+ intact and symmetric  CHEST/LUNG: Clear to auscultation bilaterally; No rales, rhonchi, wheezing, or rubs  HEART: Regular rate and rhythm; No murmurs, rubs, or gallops  ABDOMEN: Soft, Nontender, Nondistended; Bowel sounds present  EXTREMITIES:  2+ Peripheral Pulses, No clubbing, cyanosis, or edema  MEDICATIONS  (STANDING):  acyclovir   Oral Tab/Cap 400 milliGRAM(s) Oral two times a day  brimonidine 0.2% Ophthalmic Solution 1 Drop(s) Left EYE two times a day  chlorhexidine 4% Liquid 1 Application(s) Topical <User Schedule>  ciprofloxacin   IVPB      ciprofloxacin   IVPB 400 milliGRAM(s) IV Intermittent every 12 hours  dorzolamide 2% Ophthalmic Solution 1 Drop(s) Left EYE <User Schedule>  enoxaparin Injectable 40 milliGRAM(s) SubCutaneous daily  hydrocortisone sodium succinate Injectable 50 milliGRAM(s) IV Push every 8 hours  latanoprost 0.005% Ophthalmic Solution 1 Drop(s) Both EYES at bedtime  levETIRAcetam 750 milliGRAM(s) Oral two times a day  midodrine 20 milliGRAM(s) Oral every 8 hours  pantoprazole  Injectable 40 milliGRAM(s) IV Push daily  senna 2 Tablet(s) Oral at bedtime    MEDICATIONS  (PRN):  acetaminophen   Tablet .. 650 milliGRAM(s) Oral every 6 hours PRN Mild Pain (1 - 3)    LABS:                        7.0    2.80  )-----------( 54       ( 2020 07:15 )             22.3     07-16    141  |  110<H>  |  20  ----------------------------<  141<H>  4.5   |  22  |  0.99    Ca    8.1<L>      2020 07:15          CAPILLARY BLOOD GLUCOSE                RECENT CULTURES:    RADIOLOGY & ADDITIONAL TESTS:    DVT/GI ppx  Discussed with pt @ bedside

## 2020-07-16 NOTE — PROGRESS NOTE ADULT - SUBJECTIVE AND OBJECTIVE BOX
lying in bed    Vital Signs Last 24 Hrs  T(C): 36.9 (16 Jul 2020 05:09), Max: 37.1 (15 Jul 2020 17:34)  T(F): 98.4 (16 Jul 2020 05:09), Max: 98.8 (15 Jul 2020 17:34)  HR: 100 (16 Jul 2020 05:09) (72 - 107)  BP: 130/88 (16 Jul 2020 05:09) (120/81 - 130/91)  BP(mean): --  RR: 19 (16 Jul 2020 05:09) (17 - 19)  SpO2: 95% (16 Jul 2020 05:09) (95% - 98%)    PHYSICAL EXAM:    general - weak looking +  HEENT - No Icterus  CVS - RRR  RS - AE B/L  Abd - soft, NT  Ext - Pulses +        LABS:                        7.0    2.80  )-----------( 54       ( 16 Jul 2020 07:15 )             22.3     07-16    141  |  110<H>  |  20  ----------------------------<  141<H>  4.5   |  22  |  0.99    Ca    8.1<L>      16 Jul 2020 07:15            Culture - Urine (collected 10 Jul 2020 11:17)  Source: .Urine Clean Catch (Midstream)  Final Report (12 Jul 2020 13:38):    >100,000 CFU/ml Pseudomonas aeruginosa  Organism: Pseudomonas aeruginosa (12 Jul 2020 13:38)  Organism: Pseudomonas aeruginosa (12 Jul 2020 13:38)    Culture - Blood (collected 10 Jul 2020 09:01)  Source: .Blood Blood-Peripheral  Final Report (15 Jul 2020 10:01):    No Growth Final    Culture - Blood (collected 10 Jul 2020 09:01)  Source: .Blood Blood-Peripheral  Final Report (15 Jul 2020 10:01):    No Growth Final        RADIOLOGY & ADDITIONAL STUDIES:

## 2020-07-17 NOTE — PROGRESS NOTE ADULT - SUBJECTIVE AND OBJECTIVE BOX
Patient: ZEINA PAUL [id number] 84y Male                           Internal Medicine Hospitalist Progress Note    Initial HPI:  Denies fatigue.  No chest pain / palpitations.  Confused.       ____________________PHYSICAL EXAM:  GENERAL:  NAD Alert, confused.   HEENT: NCAT  CARDIOVASCULAR:  S1, S2  LUNGS: CTAB  ABDOMEN:  soft, (-) tenderness, (-) distension, (+) bowel sounds, (-) guarding, (-) rebound (-) rigidity  EXTREMITIES:  no cyanosis / clubbing / edema.   ____________________     VITALS:  Vital Signs Last 24 Hrs  T(C): 36.4 (17 Jul 2020 10:05), Max: 36.9 (16 Jul 2020 17:22)  T(F): 97.6 (17 Jul 2020 10:05), Max: 98.4 (16 Jul 2020 17:22)  HR: 109 (17 Jul 2020 10:05) (95 - 111)  BP: 110/79 (17 Jul 2020 10:05) (110/79 - 130/88)  BP(mean): --  RR: 18 (17 Jul 2020 10:05) (17 - 18)  SpO2: 98% (17 Jul 2020 10:05) (95% - 99%) Daily     Daily   CAPILLARY BLOOD GLUCOSE        I&O's Summary    16 Jul 2020 07:01  -  17 Jul 2020 07:00  --------------------------------------------------------  IN: 250 mL / OUT: 400 mL / NET: -150 mL    17 Jul 2020 07:01  -  17 Jul 2020 14:54  --------------------------------------------------------  IN: 60 mL / OUT: 0 mL / NET: 60 mL          BACKGROUND:  HEALTH ISSUES - PROBLEM Dx:  Moderate protein-calorie malnutrition: Moderate protein-calorie malnutrition  Iron deficiency anemia due to chronic blood loss: Iron deficiency anemia due to chronic blood loss  Constipation: Constipation  Sepsis due to Pseudomonas species without acute organ dysfunction: Sepsis due to Pseudomonas species without acute organ dysfunction  Multiple myeloma: Multiple myeloma        Allergies    Bactrim (Swelling)  meropenem (Rash)    Intolerances      PAST MEDICAL & SURGICAL HISTORY:  Hypertension  Urinary incontinence  Radiation cystitis  Prostate cancer  S/P TURP        LABS:                        6.8    2.90  )-----------( 53       ( 17 Jul 2020 06:59 )             21.7     07-16    141  |  110<H>  |  20  ----------------------------<  141<H>  4.5   |  22  |  0.99    Ca    8.1<L>      16 Jul 2020 07:15                    MEDICATIONS:  MEDICATIONS  (STANDING):  acyclovir   Oral Tab/Cap 400 milliGRAM(s) Oral two times a day  brimonidine 0.2% Ophthalmic Solution 1 Drop(s) Left EYE two times a day  chlorhexidine 4% Liquid 1 Application(s) Topical <User Schedule>  ciprofloxacin   IVPB      ciprofloxacin   IVPB 400 milliGRAM(s) IV Intermittent every 12 hours  dorzolamide 2% Ophthalmic Solution 1 Drop(s) Left EYE <User Schedule>  enoxaparin Injectable 40 milliGRAM(s) SubCutaneous daily  hydrocortisone sodium succinate Injectable 50 milliGRAM(s) IV Push every 8 hours  latanoprost 0.005% Ophthalmic Solution 1 Drop(s) Both EYES at bedtime  levETIRAcetam 750 milliGRAM(s) Oral two times a day  midodrine 20 milliGRAM(s) Oral every 8 hours  pantoprazole  Injectable 40 milliGRAM(s) IV Push daily  senna 2 Tablet(s) Oral at bedtime    MEDICATIONS  (PRN):  acetaminophen   Tablet .. 650 milliGRAM(s) Oral every 6 hours PRN Mild Pain (1 - 3)

## 2020-07-17 NOTE — PROVIDER CONTACT NOTE (CRITICAL VALUE NOTIFICATION) - BACKGROUND
Pt admitted with sepsis and hypotension. Hx of multiple myelomam HTN and prostate CA Pt admitted with sepsis and hypotension. Hx of multiple myeloma, HTN and prostate CA

## 2020-07-17 NOTE — PROVIDER CONTACT NOTE (OTHER) - SITUATION
f/u with blood bank regarding anticipated units of PRBC. Per Jarrett (blood bank), units are still unavailable.

## 2020-07-17 NOTE — PROVIDER CONTACT NOTE (OTHER) - ACTION/TREATMENT ORDERED:
Administer PRBC as soon as availible. Endorsed to AM nurse at change of shift.
Waiting for units to become available.

## 2020-07-17 NOTE — PROVIDER CONTACT NOTE (CRITICAL VALUE NOTIFICATION) - SITUATION
pt received 1 unit of blood and procrit during this admission. Pt's hemoglobin has been trending down. 7.2 Wednesday. 7.0 critical value on 07/16/20 and 6.8 this morning. Blood was unavailable yesterday and early this morning.

## 2020-07-17 NOTE — PROGRESS NOTE ADULT - SUBJECTIVE AND OBJECTIVE BOX
looking weak    Vital Signs Last 24 Hrs  T(C): 36.4 (17 Jul 2020 10:05), Max: 36.9 (16 Jul 2020 17:22)  T(F): 97.6 (17 Jul 2020 10:05), Max: 98.4 (16 Jul 2020 17:22)  HR: 109 (17 Jul 2020 10:05) (59 - 111)  BP: 110/79 (17 Jul 2020 10:05) (110/79 - 130/88)  BP(mean): --  RR: 18 (17 Jul 2020 10:05) (16 - 18)  SpO2: 98% (17 Jul 2020 10:05) (95% - 100%)    PHYSICAL EXAM:    general - AAO x 3  HEENT - No Icterus  CVS - RRR  RS - AE B/L  Abd - soft, NT  Ext - Pulses +        LABS:                        6.8    2.90  )-----------( 53       ( 17 Jul 2020 06:59 )             21.7     07-16    141  |  110<H>  |  20  ----------------------------<  141<H>  4.5   |  22  |  0.99    Ca    8.1<L>      16 Jul 2020 07:15            Culture - Urine (collected 10 Jul 2020 11:17)  Source: .Urine Clean Catch (Midstream)  Final Report (12 Jul 2020 13:38):    >100,000 CFU/ml Pseudomonas aeruginosa  Organism: Pseudomonas aeruginosa (12 Jul 2020 13:38)  Organism: Pseudomonas aeruginosa (12 Jul 2020 13:38)    Culture - Blood (collected 10 Jul 2020 09:01)  Source: .Blood Blood-Peripheral  Final Report (15 Jul 2020 10:01):    No Growth Final    Culture - Blood (collected 10 Jul 2020 09:01)  Source: .Blood Blood-Peripheral  Final Report (15 Jul 2020 10:01):    No Growth Final        RADIOLOGY & ADDITIONAL STUDIES:

## 2020-07-17 NOTE — CHART NOTE - NSCHARTNOTEFT_GEN_A_CORE
Pt admitted c AMS; found to have sepsis 2/2 UTI; hypotension. Pt remains confused & lethargic. PMHx includes Multiple Myeloma, Prostate Ca, Iron deficiency anemia    Factors impacting intake: [ ] none [ ] nausea  [ ] vomiting [ ] diarrhea [ ] constipation  [ ]chewing problems [ ] swallowing issues  [ X] other: AMS; lethargy    Diet Prescription: Diet, Mechanical Soft:   Supplement Feeding Modality:  Oral  Ensure Enlive Cans or Servings Per Day:  1       Frequency:  Two Times a day (07-14-20 @ 14:20)    Intake: poor; 0-25% most meals; drinking some of supplement; requires total assistance    Current Weight:   71.2 kg (7/16); admission wt 72.3 kg (7/12); recorded wt of 59 kg (7/10) appears to be inaccurate  % Weight Change: 1.2% wt loss x 4 days    Physical Appearance:  1+ b/l arms & 2+ b/l legs    Pertinent Medications: MEDICATIONS  (STANDING):  acyclovir   Oral Tab/Cap 400 milliGRAM(s) Oral two times a day  brimonidine 0.2% Ophthalmic Solution 1 Drop(s) Left EYE two times a day  chlorhexidine 4% Liquid 1 Application(s) Topical <User Schedule>  ciprofloxacin   IVPB      ciprofloxacin   IVPB 400 milliGRAM(s) IV Intermittent every 12 hours  dorzolamide 2% Ophthalmic Solution 1 Drop(s) Left EYE <User Schedule>  enoxaparin Injectable 40 milliGRAM(s) SubCutaneous daily  hydrocortisone sodium succinate Injectable 50 milliGRAM(s) IV Push every 8 hours  latanoprost 0.005% Ophthalmic Solution 1 Drop(s) Both EYES at bedtime  levETIRAcetam 750 milliGRAM(s) Oral two times a day  midodrine 20 milliGRAM(s) Oral every 8 hours  pantoprazole  Injectable 40 milliGRAM(s) IV Push daily  senna 2 Tablet(s) Oral at bedtime    MEDICATIONS  (PRN):  acetaminophen   Tablet .. 650 milliGRAM(s) Oral every 6 hours PRN Mild Pain (1 - 3)    Pertinent Labs:  07-16 Na141 mmol/L Glu 141 mg/dL<H> K+ 4.5 mmol/L Cr  0.99 mg/dL BUN 20 mg/dL 07-12 Phos 2.5 mg/dL    Skin:   stage II pressure ulcer on sacrum; DTI noted on left heel    Estimated Needs:   [X ] no change since previous assessment (7/13)  [ ] recalculated:     Previous Nutrition Diagnosis:   [X ] Severe malnutrition in context of chronic illness  Etiology:  Inadequate energy/protein intake + increased energy/protein needs related to multiple myeloma, prostate Ca s/p chemo & radiation, iron deficiency anemia, pressure ulcer  Signs & Symptoms:  physical findings of severe muscle wasting as noted on 7/13 assessment; 53% wt loss x 1 year    GOAL:  Pt to meet >75% energy/protein needs via meals/supplements - not met at this time    Nutrition Diagnosis is [X ] ongoing  [ ] resolved [ ] not applicable     New Nutrition Diagnosis: [X ] not applicable    Interventions:   Recommend  continue current diet rx as noted  [ ] Change Diet To:  [ ] Nutrition Supplement  [ ] Nutrition Support  [ ] Other:     Monitoring and Evaluation:   [X ] PO intake [ x ] Tolerance to diet prescription [ x ] weights [ x ] labs[ x ] follow up per protocol  [ ] other: Pt admitted c AMS; found to have sepsis 2/2 UTI; hypotension. Pt remains confused & lethargic. PMHx includes Multiple Myeloma, Prostate Ca, Iron deficiency anemia    Factors impacting intake: [ ] none [ ] nausea  [ ] vomiting [ ] diarrhea [ ] constipation  [ ]chewing problems [ ] swallowing issues  [ X] other: AMS; lethargy    Diet Prescription: Diet, Mechanical Soft:   Supplement Feeding Modality:  Oral  Ensure Enlive Cans or Servings Per Day:  1       Frequency:  Two Times a day (07-14-20 @ 14:20)    Intake: poor; 0-25% most meals; drinking some of supplement; requires total assistance    Current Weight:   71.2 kg (7/16); admission wt 72.3 kg (7/12); recorded wt of 59 kg (7/10) appears to be inaccurate  % Weight Change: 1.2% wt loss x 4 days    Physical Appearance:  1+ b/l arms & 2+ b/l legs    Pertinent Medications: MEDICATIONS  (STANDING):  acyclovir   Oral Tab/Cap 400 milliGRAM(s) Oral two times a day  brimonidine 0.2% Ophthalmic Solution 1 Drop(s) Left EYE two times a day  chlorhexidine 4% Liquid 1 Application(s) Topical <User Schedule>  ciprofloxacin   IVPB      ciprofloxacin   IVPB 400 milliGRAM(s) IV Intermittent every 12 hours  dorzolamide 2% Ophthalmic Solution 1 Drop(s) Left EYE <User Schedule>  enoxaparin Injectable 40 milliGRAM(s) SubCutaneous daily  hydrocortisone sodium succinate Injectable 50 milliGRAM(s) IV Push every 8 hours  latanoprost 0.005% Ophthalmic Solution 1 Drop(s) Both EYES at bedtime  levETIRAcetam 750 milliGRAM(s) Oral two times a day  midodrine 20 milliGRAM(s) Oral every 8 hours  pantoprazole  Injectable 40 milliGRAM(s) IV Push daily  senna 2 Tablet(s) Oral at bedtime    MEDICATIONS  (PRN):  acetaminophen   Tablet .. 650 milliGRAM(s) Oral every 6 hours PRN Mild Pain (1 - 3)    Pertinent Labs:  07-16 Na141 mmol/L Glu 141 mg/dL<H> K+ 4.5 mmol/L Cr  0.99 mg/dL BUN 20 mg/dL 07-12 Phos 2.5 mg/dL    Skin:   stage II pressure ulcer on sacrum; DTI noted on left heel    Estimated Needs:   [X ] no change since previous assessment (7/10)  [ ] recalculated:     Previous Nutrition Diagnosis:   [X ] Severe malnutrition in context of chronic illness  Etiology:  Inadequate energy/protein intake + increased energy/protein needs related to multiple myeloma, prostate Ca s/p chemo & radiation, iron deficiency anemia, pressure ulcer  Signs & Symptoms:  physical findings of severe muscle wasting as noted on 7/13 assessment; 53% wt loss x 1 year    GOAL:  Pt to meet >75% energy/protein needs via meals/supplements - not met at this time    Nutrition Diagnosis is [X ] ongoing  [ ] resolved [ ] not applicable     New Nutrition Diagnosis: [X ] not applicable    Interventions:   Recommend  continue current diet rx as noted  [ ] Change Diet To:  [ ] Nutrition Supplement  [ ] Nutrition Support  [ ] Other:     Monitoring and Evaluation:   [X ] PO intake [ x ] Tolerance to diet prescription [ x ] weights [ x ] labs[ x ] follow up per protocol  [ ] other:

## 2020-07-18 NOTE — GOALS OF CARE CONVERSATION - ADVANCED CARE PLANNING - CONVERSATION DETAILS
84M PMH HTN, Multiple Myeloma, and prostate ca  s/p RT, radiation cystitis, radiation proctitis, urinary incontinence, upper GI bleed, hx of UTI admitted to ICU for sepsis, septic shock secondary to urinary source of infection (UTI).    Met with his wife Luz Villalobos and discussed advanced directives and goals of care.  As per wife patient had few loss in his life in last few months his sister , his brother in law  and also two of his nephews .  since then he is getting more sick.  As per wife patient doesn't wanted to be resuscitated or intubated.  PEPE Completed DNR/ DNI

## 2020-07-18 NOTE — PROGRESS NOTE ADULT - SUBJECTIVE AND OBJECTIVE BOX
lying in bed, sick looking +    Vital Signs Last 24 Hrs  T(C): 36.4 (18 Jul 2020 05:25), Max: 36.6 (17 Jul 2020 19:37)  T(F): 97.5 (18 Jul 2020 05:25), Max: 97.9 (17 Jul 2020 19:37)  HR: 115 (18 Jul 2020 05:25) (106 - 120)  BP: 110/86 (18 Jul 2020 05:25) (106/82 - 131/72)  BP(mean): --  RR: 16 (18 Jul 2020 05:25) (16 - 18)  SpO2: 95% (18 Jul 2020 05:25) (92% - 98%)    PHYSICAL EXAM:    general - sick looking +  HEENT - No Icterus  CVS - RRR  RS - AE B/L  Abd - soft, NT  Ext - Pulses +        LABS:                        9.2    3.45  )-----------( 40       ( 18 Jul 2020 07:29 )             29.5                 Culture - Urine (collected 10 Jul 2020 11:17)  Source: .Urine Clean Catch (Midstream)  Final Report (12 Jul 2020 13:38):    >100,000 CFU/ml Pseudomonas aeruginosa  Organism: Pseudomonas aeruginosa (12 Jul 2020 13:38)  Organism: Pseudomonas aeruginosa (12 Jul 2020 13:38)    Culture - Blood (collected 10 Jul 2020 09:01)  Source: .Blood Blood-Peripheral  Final Report (15 Jul 2020 10:01):    No Growth Final    Culture - Blood (collected 10 Jul 2020 09:01)  Source: .Blood Blood-Peripheral  Final Report (15 Jul 2020 10:01):    No Growth Final        RADIOLOGY & ADDITIONAL STUDIES:

## 2020-07-18 NOTE — PROGRESS NOTE ADULT - SUBJECTIVE AND OBJECTIVE BOX
Patient: ZEINA PAUL [id number] 84y Male                           Internal Medicine Hospitalist Progress Note    Wife at bedside.  States pt much more lethargic than baseline.     ____________________PHYSICAL EXAM:  GENERAL:  NAD Lethargic.  Opens eyes to voice  HEENT: NCAT  CARDIOVASCULAR:  S1, S2  LUNGS: CTAB  ABDOMEN:  soft, (-) tenderness, (-) distension, (+) bowel sounds, (-) guarding, (-) rebound (-) rigidity  EXTREMITIES:  no cyanosis / clubbing / edema.   NEURO: grimaces to noxious stimuli to b/l extremities  ____________________    VITALS:  Vital Signs Last 24 Hrs  T(C): 36.6 (2020 12:40), Max: 36.6 (2020 19:37)  T(F): 97.8 (2020 12:40), Max: 97.9 (2020 19:37)  HR: 59 (2020 12:40) (59 - 120)  BP: 125/96 (2020 12:40) (106/82 - 131/72)  BP(mean): --  RR: 16 (2020 12:40) (16 - 18)  SpO2: 93% (2020 12:40) (92% - 97%) Daily     Daily Weight in k.8 (2020 05:25)  CAPILLARY BLOOD GLUCOSE        I&O's Summary    2020 07:01  -  2020 07:00  --------------------------------------------------------  IN: 815 mL / OUT: 0 mL / NET: 815 mL        LABS:                        9.2    3.45  )-----------( 40       ( 2020 07:29 )             29.5                         MEDICATIONS:  acetaminophen   Tablet .. 650 milliGRAM(s) Oral every 6 hours PRN  acyclovir IVPB 450 milliGRAM(s) IV Intermittent every 12 hours  brimonidine 0.2% Ophthalmic Solution 1 Drop(s) Left EYE two times a day  chlorhexidine 4% Liquid 1 Application(s) Topical <User Schedule>  ciprofloxacin   IVPB      ciprofloxacin   IVPB 400 milliGRAM(s) IV Intermittent every 12 hours  dorzolamide 2% Ophthalmic Solution 1 Drop(s) Left EYE <User Schedule>  enoxaparin Injectable 40 milliGRAM(s) SubCutaneous daily  hydrocortisone sodium succinate Injectable 50 milliGRAM(s) IV Push every 8 hours  latanoprost 0.005% Ophthalmic Solution 1 Drop(s) Both EYES at bedtime  levETIRAcetam  IVPB 750 milliGRAM(s) IV Intermittent every 12 hours  midodrine 20 milliGRAM(s) Oral every 8 hours  pantoprazole  Injectable 40 milliGRAM(s) IV Push daily  senna 2 Tablet(s) Oral at bedtime

## 2020-07-19 NOTE — PROGRESS NOTE ADULT - SUBJECTIVE AND OBJECTIVE BOX
weak looking +    Vital Signs Last 24 Hrs  T(C): 36.4 (19 Jul 2020 11:00), Max: 36.9 (19 Jul 2020 04:58)  T(F): 97.6 (19 Jul 2020 11:00), Max: 98.4 (19 Jul 2020 04:58)  HR: 111 (19 Jul 2020 11:00) (59 - 114)  BP: 119/94 (19 Jul 2020 11:00) (106/81 - 129/97)  BP(mean): --  RR: 20 (19 Jul 2020 11:00) (16 - 20)  SpO2: 90% (19 Jul 2020 11:00) (90% - 100%)    PHYSICAL EXAM:    general - lethargic  HEENT - No Icterus  CVS - RRR  RS - AE B/L  Abd - soft, NT  Ext - Pulses +        LABS:                        8.8    3.55  )-----------( 34       ( 19 Jul 2020 07:25 )             27.6                 Culture - Urine (collected 10 Jul 2020 11:17)  Source: .Urine Clean Catch (Midstream)  Final Report (12 Jul 2020 13:38):    >100,000 CFU/ml Pseudomonas aeruginosa  Organism: Pseudomonas aeruginosa (12 Jul 2020 13:38)  Organism: Pseudomonas aeruginosa (12 Jul 2020 13:38)    Culture - Blood (collected 10 Jul 2020 09:01)  Source: .Blood Blood-Peripheral  Final Report (15 Jul 2020 10:01):    No Growth Final    Culture - Blood (collected 10 Jul 2020 09:01)  Source: .Blood Blood-Peripheral  Final Report (15 Jul 2020 10:01):    No Growth Final        RADIOLOGY & ADDITIONAL STUDIES:

## 2020-07-19 NOTE — PROGRESS NOTE ADULT - SUBJECTIVE AND OBJECTIVE BOX
Patient: ZEINA PAUL [id number] 84y Male                           Internal Medicine Hospitalist Progress Note    Wife at bedside.  States pt much more lethargic than baseline.     ____________________PHYSICAL EXAM:  GENERAL:  NAD Lethargic.  Opens eyes to voice  HEENT: NCAT  CARDIOVASCULAR:  S1, S2  LUNGS: CTAB  ABDOMEN:  soft, (-) tenderness, (-) distension, (+) bowel sounds, (-) guarding, (-) rebound (-) rigidity  EXTREMITIES:  no cyanosis / clubbing / edema.   NEURO: grimaces to noxious stimuli to b/l extremities  ____________________    VITALS:  Vital Signs Last 24 Hrs  T(C): 36.4 (2020 11:00), Max: 36.9 (2020 04:58)  T(F): 97.6 (2020 11:00), Max: 98.4 (2020 04:58)  HR: 111 (2020 11:00) (62 - 114)  BP: 119/94 (2020 11:00) (106/81 - 129/97)  BP(mean): --  RR: 20 (2020 11:00) (17 - 20)  SpO2: 90% (2020 11:00) (90% - 100%) Daily     Daily Weight in k.1 (2020 04:58)  CAPILLARY BLOOD GLUCOSE      POCT Blood Glucose.: 141 mg/dL (2020 16:29)    I&O's Summary    2020 07:01  -  2020 07:00  --------------------------------------------------------  IN: 1075 mL / OUT: 0 mL / NET: 1075 mL        LABS:                        8.8    3.55  )-----------( 34       ( 2020 07:25 )             27.6                         MEDICATIONS:  acetaminophen   Tablet .. 650 milliGRAM(s) Oral every 6 hours PRN  acyclovir IVPB 450 milliGRAM(s) IV Intermittent every 12 hours  brimonidine 0.2% Ophthalmic Solution 1 Drop(s) Left EYE two times a day  chlorhexidine 4% Liquid 1 Application(s) Topical <User Schedule>  ciprofloxacin   IVPB      ciprofloxacin   IVPB 400 milliGRAM(s) IV Intermittent every 12 hours  dexAMETHasone  IVPB 40 milliGRAM(s) IV Intermittent once  dextrose 5% + sodium chloride 0.45%. 1000 milliLiter(s) IV Continuous <Continuous>  dorzolamide 2% Ophthalmic Solution 1 Drop(s) Left EYE <User Schedule>  enoxaparin Injectable 40 milliGRAM(s) SubCutaneous daily  latanoprost 0.005% Ophthalmic Solution 1 Drop(s) Both EYES at bedtime  levETIRAcetam  IVPB 750 milliGRAM(s) IV Intermittent every 12 hours  midodrine 20 milliGRAM(s) Oral every 8 hours  pantoprazole  Injectable 40 milliGRAM(s) IV Push daily  senna 2 Tablet(s) Oral at bedtime

## 2020-07-19 NOTE — CHART NOTE - NSCHARTNOTEFT_GEN_A_CORE
Pt noted with SaO2 88% on RA per RN.  Remains lethargic.  b/l Rhonchi on exam.  No fevers.  D/w wife at bedside.   Concern for possible aspiration.  Will escalate to Zosyn for now.    Check CXR.   Wife reiterates DNR / DNI status. Pt noted with SaO2 88% on RA per RN.  Remains lethargic.  b/l Rhonchi on exam.  No fevers.  D/w wife at bedside.   Concern for possible aspiration.  Continue current abx for now.    Check CXR.   Wife reiterates DNR / DNI status.

## 2020-07-20 NOTE — DISCHARGE NOTE FOR THE EXPIRED PATIENT - HOSPITAL COURSE
84M PMH HTN, Multiple Myeloma, and prostate ca  s/p RT, radiation cystitis, radiation proctitis, urinary incontinence, upper GI bleed, hx of UTI admitted to ICU for sepsis, septic shock secondary to urinary source of infection (UTI), also developed HAP.  Pt treated as below:     #  Problem: Multiple myeloma.  Plan: - Heme/Onc assessment appreciated.   s/p pRBCs.  Mildly pancytopenic.  Started on Decadron, d/w Dr Anaya.  # HAP: CXR shows new infiltrates.  Started broad spectrum abx.  Sent MRSA PCR and legionella.   #  Problem: Sepsis due to Pseudomonas species without acute organ dysfunction.  Plan: - pan sensitive Psuedomonas by Urine culture  - Ciprofloxacin since 7/13 to 7/20.  Changed to Vanco and Zosyn for HAP coverage  # Metabolic Encephalopathy / Altered mental Status - unclear etiology.  CT head showed no acute infarct.  Supportive care.   # Problem: Constipation.  Plan: Laxatives prn.   # Problem: Iron deficiency anemia due to chronic blood loss.  Plan: - iron replacement with Vitamin C.   # Problem: Severe protein-calorie malnutrition.  Plan: - added ensure enlive BID  # DVT Prophylaxis - Lovenox subcut    Was contacted by RN; patient not responsive, pulseless, and without spontaneous respirations.  Upon examination, heart sounds absent, patient pulseless and without spontaneous respirations.  Patient pronounced dead at 17:35.    Time spent 45 minutes.

## 2020-07-20 NOTE — PROGRESS NOTE ADULT - SUBJECTIVE AND OBJECTIVE BOX
84M PMH HTN, Multiple Myeloma, and prostate ca  s/p RT, radiation cystitis, radiation proctitis, urinary incontinence, upper GI bleed, hx of UTI admitted to ICU for sepsis, septic shock secondary to urinary source of infection (UTI).     : Pt seen at bedside.  CXR shows new infiltrates.  Patient started on broad spectrum abx.        PAST MEDICAL & SURGICAL HISTORY:  Hypertension  Urinary incontinence  Radiation cystitis  Prostate cancer  S/P TURP      REVIEW OF SYSTEMS:  Not a reliable historian      MEDICATIONS  (STANDING):  acyclovir IVPB 450 milliGRAM(s) IV Intermittent every 12 hours  brimonidine 0.2% Ophthalmic Solution 1 Drop(s) Left EYE two times a day  chlorhexidine 4% Liquid 1 Application(s) Topical <User Schedule>  ciprofloxacin   IVPB 400 milliGRAM(s) IV Intermittent every 12 hours  dexAMETHasone  IVPB 20 milliGRAM(s) IV Intermittent every 12 hours  dextrose 5% + sodium chloride 0.45%. 1000 milliLiter(s) (75 mL/Hr) IV Continuous <Continuous>  dorzolamide 2% Ophthalmic Solution 1 Drop(s) Left EYE <User Schedule>  latanoprost 0.005% Ophthalmic Solution 1 Drop(s) Both EYES at bedtime  levETIRAcetam  IVPB 750 milliGRAM(s) IV Intermittent every 12 hours  midodrine 20 milliGRAM(s) Oral every 8 hours  pantoprazole  Injectable 40 milliGRAM(s) IV Push daily  piperacillin/tazobactam IVPB.. 3.375 Gram(s) IV Intermittent every 8 hours  senna 2 Tablet(s) Oral at bedtime  vancomycin  IVPB 1000 milliGRAM(s) IV Intermittent every 12 hours    MEDICATIONS  (PRN):  acetaminophen   Tablet .. 650 milliGRAM(s) Oral every 6 hours PRN Mild Pain (1 - 3)      Allergies  Bactrim (Swelling)  meropenem (Rash)        FAMILY HISTORY:  Family history of diabetes mellitus (Sibling)      Vital Signs Last 24 Hrs  T(C): 36 (2020 12:33), Max: 37.1 (2020 23:28)  T(F): 96.8 (2020 12:33), Max: 98.8 (2020 23:28)  HR: 100 (2020 12:33) (67 - 105)  BP: 121/98 (2020 12:33) (98/70 - 121/98)  RR: 20 (2020 12:33) (18 - 20)  SpO2: 98% (2020 12:33) (86% - 98%)    PHYSICAL EXAM:  GENERAL:  NAD Lethargic.  Opens eyes to voice  HEENT: NCAT  CARDIOVASCULAR:  S1, S2  LUNGS: CTAB  ABDOMEN:  soft, (-) tenderness, (-) distension, (+) bowel sounds, (-) guarding, (-) rebound (-) rigidity  EXTREMITIES:  no cyanosis / clubbing / edema.   NEURO: grimaces to noxious stimuli to b/l extremities      LABS:                        7.4    3.25  )-----------( 27       ( 2020 07:14 )             24.3             Urinalysis Basic - ( 2020 20:24 )    Color: Yellow / Appearance: Clear / S.015 / pH: x  Gluc: x / Ketone: Negative  / Bili: Negative / Urobili: Negative mg/dL   Blood: x / Protein: 30 mg/dL / Nitrite: Negative   Leuk Esterase: Moderate / RBC: 0-2 /HPF / WBC 0-2   Sq Epi: x / Non Sq Epi: Occasional / Bacteria: Few        RADIOLOGY & ADDITIONAL STUDIES:

## 2020-07-20 NOTE — PROGRESS NOTE ADULT - PROBLEM SELECTOR PLAN 1
- consider Blood transfusion  - physical therapy  - Abx  - supportive care
- Abx for sepsis  - Watch Blood Counts transfuse as necessary  - Physical therapy  - Supportive care
- Will try decadron 40mg a day x 4 days  - Abx for sepsis  - Prognosis Guarded
- agree with blood transfusion  - abx  - physical therapy
- antibiotics   - Transfuse as necessary  - to f/u with Primary Oncologist Dr Marlow as outpatient  - will give dose of epogen
- continue ICU care  - currently was on selinxor  - Abx
- on decadron  - prognosis poor  - palliative care
- pan sensitive Psuedomonas by Urine culture  - c/w Ciprofloxacin  - c/w IVF
- s/p transfusion  - s/p Daratumamab, Seelinxor  - IgA Myeloma  - ICU care
- watch blood counts, transfuse as necessary  - Abx  - physical therapy
- watch blood counts, transfuse as necessary  - supportive care  - physical therapy

## 2020-07-20 NOTE — PROGRESS NOTE ADULT - PROVIDER SPECIALTY LIST ADULT
Critical Care
Critical Care
Heme/Onc
Hospitalist
Heme/Onc

## 2020-07-20 NOTE — PROGRESS NOTE ADULT - SUBJECTIVE AND OBJECTIVE BOX
lying in bed    Vital Signs Last 24 Hrs  T(C): 36 (2020 05:21), Max: 37.1 (2020 23:28)  T(F): 96.8 (2020 05:21), Max: 98.8 (2020 23:28)  HR: 105 (2020 05:21) (67 - 111)  BP: 119/92 (2020 05:21) (98/70 - 119/94)  BP(mean): --  RR: 20 (2020 05:21) (18 - 20)  SpO2: 86% (2020 05:21) (86% - 97%)    PHYSICAL EXAM:    general - lethargic +  HEENT - No Icterus  CVS - RRR  RS - AE B/L  Abd - soft, NT  Ext - Pulses +        LABS:                        7.4    3.25  )-----------( 27       ( 2020 07:14 )             24.3             Urinalysis Basic - ( 2020 20:24 )    Color: Yellow / Appearance: Clear / S.015 / pH: x  Gluc: x / Ketone: Negative  / Bili: Negative / Urobili: Negative mg/dL   Blood: x / Protein: 30 mg/dL / Nitrite: Negative   Leuk Esterase: Moderate / RBC: 0-2 /HPF / WBC 0-2   Sq Epi: x / Non Sq Epi: Occasional / Bacteria: Few        Culture - Urine (collected 10 Jul 2020 11:17)  Source: .Urine Clean Catch (Midstream)  Final Report (2020 13:38):    >100,000 CFU/ml Pseudomonas aeruginosa  Organism: Pseudomonas aeruginosa (2020 13:38)  Organism: Pseudomonas aeruginosa (2020 13:38)        RADIOLOGY & ADDITIONAL STUDIES:

## 2020-07-20 NOTE — PROGRESS NOTE ADULT - REASON FOR ADMISSION
Sepsis with hypotension.

## 2020-07-20 NOTE — PROGRESS NOTE ADULT - ASSESSMENT
Multiple Myeloma
84M PMH HTN, Multiple Myeloma, and prostate ca  s/p RT, radiation cystitis, radiation proctitis, urinary incontinence, upper GI bleed, hx of UTI admitted to ICU for sepsis, septic shock secondary to urinary source of infection (UTI)
84M PMH HTN, Multiple Myeloma, and prostate ca  s/p RT, radiation cystitis, radiation proctitis, urinary incontinence, upper GI bleed, hx of UTI admitted to ICU for sepsis, septic shock secondary to urinary source of infection (UTI)    - remains off levophed x 24 hours   - BP stable on increased midodrine dose of 20mg  - cont with stress dose steroids as pt was on dexamethasone as output  - U culture growing pseudomonas, will d/c ceftriaxone as it does not have good pseudomonal coverage. change antibiotics to cipro for better pseudomonal coverage  - blood cx negative to date  - lactate cleared  - s/p 1 unit pRBC on admission for Hb 7. Anemia likely due to underlying myeloma. No evidence of active bleeding.   - heme/onc follow up
84M PMH HTN, Multiple Myeloma, and prostate ca  s/p RT, radiation cystitis, radiation proctitis, urinary incontinence, upper GI bleed, hx of UTI admitted to ICU for sepsis, septic shock secondary to urinary source of infection (UTI)    Problem/Plan - 1:  ·  Problem: Sepsis due to Pseudomonas species without acute organ dysfunction.  Plan: - pan sensitive Psuedomonas by Urine culture  - c/w Ciprofloxacin  - c/w IVF.     Problem/Plan - 2:  ·  Problem: Constipation.  Plan: - would start with senna and lactulose  - mobilize patient OOB to chair.     Problem/Plan - 3:  ·  Problem: Multiple myeloma.  Plan: - Heme/Onc assessment appreciated  - trend hgb and transfuse < 7.     Problem/Plan - 4:  ·  Problem: Iron deficiency anemia due to chronic blood loss.  Plan: - iron replacement with Vitamin C.     Problem/Plan - 5:  ·  Problem: Severe protein-calorie malnutrition.  Plan: - adding ensure enlive BID
84M PMH HTN, Multiple Myeloma, and prostate ca  s/p RT, radiation cystitis, radiation proctitis, urinary incontinence, upper GI bleed, hx of UTI admitted to ICU for sepsis, septic shock secondary to urinary source of infection (UTI)    Problem/Plan - 1:  ·  Problem: Sepsis due to Pseudomonas species without acute organ dysfunction.  Plan: - pan sensitive Psuedomonas by Urine culture  - c/w Ciprofloxacin  - c/w IVF.     Problem/Plan - 2:  ·  Problem: Constipation.  Plan: - would start with senna and lactulose  - mobilize patient OOB to chair.     Problem/Plan - 3:  ·  Problem: Multiple myeloma.  Plan: - Heme/Onc assessment appreciated  - trend hgb and transfuse < 7.   - likely will need PRBC tomorrow    Problem/Plan - 4:  ·  Problem: Iron deficiency anemia due to chronic blood loss.  Plan: - iron replacement with Vitamin C.     Problem/Plan - 5:  ·  Problem: Severe protein-calorie malnutrition.  Plan: - adding ensure enlive BID
84M PMH HTN, Multiple Myeloma, and prostate ca  s/p RT, radiation cystitis, radiation proctitis, urinary incontinence, upper GI bleed, hx of UTI admitted to ICU for sepsis, septic shock secondary to urinary source of infection (UTI)    Problem/Plan - 1:  ·  Problem: Sepsis due to Pseudomonas species without acute organ dysfunction.  Plan: - pan sensitive Psuedomonas by Urine culture  - c/w Ciprofloxacin  - c/w IVF.     Problem/Plan - 2:  ·  Problem: Constipation.  Plan: - would start with senna and lactulose  - mobilize patient OOB to chair.     Problem/Plan - 3:  ·  Problem: Multiple myeloma.  Plan: - Heme/Onc assessment appreciated  - trend hgb and transfuse < 7.   - transfusing 2U PRBC today    Problem/Plan - 4:  ·  Problem: Iron deficiency anemia due to chronic blood loss.  Plan: - iron replacement with Vitamin C.     Problem/Plan - 5:  ·  Problem: Severe protein-calorie malnutrition.  Plan: - adding ensure enlive BID
84M PMH HTN, Multiple Myeloma, and prostate ca  s/p RT, radiation cystitis, radiation proctitis, urinary incontinence, upper GI bleed, hx of UTI admitted to ICU for sepsis, septic shock secondary to urinary source of infection (UTI).       Problem/Plan - 1:  ·  Problem: Sepsis due to Pseudomonas species without acute organ dysfunction.  Plan: - pan sensitive Psuedomonas by Urine culture  - c/w Ciprofloxacin  - c/w IVF.      Problem/Plan - 2:  ·  Problem: Constipation.  Plan: - would start with senna and lactulose  - mobilize patient OOB to chair.      Problem/Plan - 3:  ·  Problem: Multiple myeloma.  Plan: - Heme/Onc assessment appreciated.   Being transfused pRBCs.  Hematology followup appreciated.     Problem/Plan - 4:  ·  Problem: Iron deficiency anemia due to chronic blood loss.  Plan: - iron replacement with Vitamin C.      Problem/Plan - 5:  ·  Problem: Severe protein-calorie malnutrition.  Plan: - adding ensure enlive BID    # DVT Prophylaxis - Lovenox subcut
84M PMH HTN, Multiple Myeloma, and prostate ca  s/p RT, radiation cystitis, radiation proctitis, urinary incontinence, upper GI bleed, hx of UTI admitted to ICU for sepsis, septic shock secondary to urinary source of infection (UTI).      #  Problem: Multiple myeloma.  Plan: - Heme/Onc assessment appreciated.   s/p pRBCs.  Mildly pancytopenic.  Started on Decadron, d/w Dr Anaya.   #  Problem: Sepsis due to Pseudomonas species without acute organ dysfunction.  Plan: - pan sensitive Psuedomonas by Urine culture  - c/w Ciprofloxacin since 7/13. Repeat UA ordered  # Metabolic Encephalopathy / Altered mental Status - unclear etiology.  CT head showed no acute infarct.  Supportive care.   # Problem: Constipation.  Plan: Laxatives prn.   # Problem: Iron deficiency anemia due to chronic blood loss.  Plan: - iron replacement with Vitamin C.   # Problem: Severe protein-calorie malnutrition.  Plan: - added ensure enlive BID  # DVT Prophylaxis - Lovenox subcut    Plan of care d/w wife at bedside, in agreement.
84M PMH HTN, Multiple Myeloma, and prostate ca  s/p RT, radiation cystitis, radiation proctitis, urinary incontinence, upper GI bleed, hx of UTI admitted to ICU for sepsis, septic shock secondary to urinary source of infection (UTI).      #  Problem: Multiple myeloma.  Plan: - Heme/Onc assessment appreciated.   s/p pRBCs.  Mildly pancytopenic.  Started on Decadron, d/w Dr Anaya.  # HAP: CXR shows new infiltrates.  Started broad spectrum abx.  Sent MRSA PCR and legionella.   #  Problem: Sepsis due to Pseudomonas species without acute organ dysfunction.  Plan: - pan sensitive Psuedomonas by Urine culture  - Ciprofloxacin since 7/13 to 7/20.  Changed to Vanco and Zosyn for HAP coverage  # Metabolic Encephalopathy / Altered mental Status - unclear etiology.  CT head showed no acute infarct.  Supportive care.   # Problem: Constipation.  Plan: Laxatives prn.   # Problem: Iron deficiency anemia due to chronic blood loss.  Plan: - iron replacement with Vitamin C.   # Problem: Severe protein-calorie malnutrition.  Plan: - added ensure enlive BID  # DVT Prophylaxis - Lovenox subcut    Wife not present at bedside today.
84M PMH HTN, Multiple Myeloma, and prostate ca  s/p RT, radiation cystitis, radiation proctitis, urinary incontinence, upper GI bleed, hx of UTI admitted to ICU for sepsis, septic shock secondary to urinary source of infection (UTI).      #  Problem: Sepsis due to Pseudomonas species without acute organ dysfunction.  Plan: - pan sensitive Psuedomonas by Urine culture  - c/w Ciprofloxacin since 7/13.   - c/w IVF.     # Metabolic Encephalopathy / Altered mental Status - pt's wife reports this is a significant change from baseline.  CT head ordered.  # Problem: Constipation.  Plan: - would start with senna and lactulose  - mobilize patient OOB to chair.   #  Problem: Multiple myeloma.  Plan: - Heme/Onc assessment appreciated.   s/p pRBCs.  H/H stable.    # Problem: Iron deficiency anemia due to chronic blood loss.  Plan: - iron replacement with Vitamin C.   # Problem: Severe protein-calorie malnutrition.  Plan: - added ensure enlive BID  # DVT Prophylaxis - Lovenox subcut    Plan of care d/w wife, in agreement.
Multiple Myeloma
84M PMH HTN, Multiple Myeloma, and prostate ca  s/p RT, radiation cystitis, radiation proctitis, urinary incontinence, upper GI bleed, hx of UTI admitted to ICU for sepsis, septic shock secondary to urinary source of infection (UTI)    - weaned off levophed this morning, BP borderline low 90s but MAP >60  - increase midodrine dose to 20mg  - cont with stress dose steroids   - U culture with gram negative organisms: follow up results, cont with ceftriaxone   - blood cx negative to date  - lactate has cleared  - s/p 1 unit pRBC on admission for Hb 7. Anemia likely due to underlying myeloma.   - heme/onc follow up  - physical therapy    Critical Care Time: 32 min

## 2020-07-20 NOTE — PROGRESS NOTE ADULT - PROBLEM SELECTOR PROBLEM 1
Multiple myeloma
Sepsis due to Pseudomonas species without acute organ dysfunction

## 2020-07-21 LAB
LEGIONELLA AG UR QL: NEGATIVE — SIGNIFICANT CHANGE UP
MRSA PCR RESULT.: SIGNIFICANT CHANGE UP
S AUREUS DNA NOSE QL NAA+PROBE: SIGNIFICANT CHANGE UP

## 2020-07-27 DIAGNOSIS — D50.0 IRON DEFICIENCY ANEMIA SECONDARY TO BLOOD LOSS (CHRONIC): ICD-10-CM

## 2020-07-27 DIAGNOSIS — A41.52 SEPSIS DUE TO PSEUDOMONAS: ICD-10-CM

## 2020-07-27 DIAGNOSIS — E27.40 UNSPECIFIED ADRENOCORTICAL INSUFFICIENCY: ICD-10-CM

## 2020-07-27 DIAGNOSIS — K62.7 RADIATION PROCTITIS: ICD-10-CM

## 2020-07-27 DIAGNOSIS — K59.00 CONSTIPATION, UNSPECIFIED: ICD-10-CM

## 2020-07-27 DIAGNOSIS — I10 ESSENTIAL (PRIMARY) HYPERTENSION: ICD-10-CM

## 2020-07-27 DIAGNOSIS — E43 UNSPECIFIED SEVERE PROTEIN-CALORIE MALNUTRITION: ICD-10-CM

## 2020-07-27 DIAGNOSIS — C90.00 MULTIPLE MYELOMA NOT HAVING ACHIEVED REMISSION: ICD-10-CM

## 2020-07-27 DIAGNOSIS — Z85.46 PERSONAL HISTORY OF MALIGNANT NEOPLASM OF PROSTATE: ICD-10-CM

## 2020-07-27 DIAGNOSIS — J18.9 PNEUMONIA, UNSPECIFIED ORGANISM: ICD-10-CM
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
## 2020-07-27 DIAGNOSIS — N30.40 IRRADIATION CYSTITIS WITHOUT HEMATURIA: ICD-10-CM

## 2020-07-27 DIAGNOSIS — N39.0 URINARY TRACT INFECTION, SITE NOT SPECIFIED: ICD-10-CM

## 2020-07-27 DIAGNOSIS — R65.21 SEVERE SEPSIS WITH SEPTIC SHOCK: ICD-10-CM

## 2020-07-27 DIAGNOSIS — D63.0 ANEMIA IN NEOPLASTIC DISEASE: ICD-10-CM

## 2020-07-27 DIAGNOSIS — G93.41 METABOLIC ENCEPHALOPATHY: ICD-10-CM

## 2020-07-27 DIAGNOSIS — Z66 DO NOT RESUSCITATE: ICD-10-CM

## 2021-08-05 NOTE — ED PROVIDER NOTE - NS ED MD DISPO DIVISION
I reviewed the H&P, I examined the patient, and there are no changes in the patient's condition and exam, however is having mild chest pressure and dizziness today.     NICOLE [place]  4:18 PM   08/05/21     [facility]

## 2024-03-31 NOTE — ED ADULT NURSE NOTE - JUGULAR VENOUS DISTENTION
absent
Bed in lowest position, wheels locked, appropriate side rails in place/Call bell, personal items and telephone in reach/Instruct patient to call for assistance before getting out of bed or chair/Non-slip footwear when patient is out of bed/[place] to call system/Physically safe environment - no spills, clutter or unnecessary equipment/Purposeful Proactive Rounding/Room/bathroom lighting operational, light cord in reach

## 2024-07-25 NOTE — ED ADULT TRIAGE NOTE - PRO INTERPRETER NEED 2
Render Note In Bullet Format When Appropriate: No
Consent: The patient's consent was obtained including but not limited to risks of crusting, scabbing, blistering, scarring, darker or lighter pigmentary change, recurrence, incomplete removal and infection.
Spray Paint Text: The liquid nitrogen was applied to the skin utilizing a spray paint frosting technique.
Post-Care Instructions: I reviewed with the patient in detail post-care instructions. Patient is to wear sunprotection, and avoid picking at any of the treated lesions. Pt may apply Vaseline to crusted or scabbing areas.
Show Topical Anesthesia Variable?: Yes
Detail Level: Detailed
Medical Necessity Information: It is in your best interest to select a reason for this procedure from the list below. All of these items fulfill various CMS LCD requirements except the new and changing color options.
Medical Necessity Clause: This procedure was medically necessary because the lesions that were treated were:
English